# Patient Record
Sex: FEMALE | Employment: FULL TIME | URBAN - METROPOLITAN AREA
[De-identification: names, ages, dates, MRNs, and addresses within clinical notes are randomized per-mention and may not be internally consistent; named-entity substitution may affect disease eponyms.]

---

## 2019-06-24 ENCOUNTER — OFFICE VISIT (OUTPATIENT)
Dept: ORTHOPEDIC SURGERY | Age: 39
End: 2019-06-24

## 2019-06-24 VITALS
OXYGEN SATURATION: 100 % | WEIGHT: 217 LBS | TEMPERATURE: 98.2 F | HEIGHT: 67 IN | RESPIRATION RATE: 16 BRPM | HEART RATE: 87 BPM | SYSTOLIC BLOOD PRESSURE: 106 MMHG | DIASTOLIC BLOOD PRESSURE: 75 MMHG | BODY MASS INDEX: 34.06 KG/M2

## 2019-06-24 DIAGNOSIS — M54.12 CERVICAL RADICULOPATHY: ICD-10-CM

## 2019-06-24 DIAGNOSIS — M48.02 CERVICAL SPINAL STENOSIS: Primary | ICD-10-CM

## 2019-06-24 RX ORDER — DIVALPROEX SODIUM 500 MG/1
500 TABLET, EXTENDED RELEASE ORAL DAILY
COMMUNITY
Start: 2019-03-18

## 2019-06-24 RX ORDER — LIDOCAINE 50 MG/G
PATCH TOPICAL
COMMUNITY
Start: 2019-06-03

## 2019-06-24 RX ORDER — ACETAMINOPHEN 500 MG/1
500 TABLET, FILM COATED ORAL 2 TIMES DAILY
COMMUNITY
Start: 2019-06-03

## 2019-06-24 RX ORDER — DICLOFENAC SODIUM 10 MG/G
2 GEL TOPICAL DAILY
COMMUNITY
Start: 2019-06-03

## 2019-06-24 RX ORDER — CYCLOBENZAPRINE HCL 10 MG
10 TABLET ORAL 2 TIMES DAILY
COMMUNITY
Start: 2019-05-07

## 2019-06-24 NOTE — PROGRESS NOTES
[address]  [address], [address]  [place], [facility]  Phone: [phone]  Fax: [phone]  : 1980  PCP: Melanie Grider, DO      NEW PATIENT      ASSESSMENT AND PLAN     Diagnoses and all orders for this visit:    1. Cervical spinal stenosis    2. Cervical radiculopathy       1. 40yo RHD  with 6 months of RUE radiculopathy, failed PT, Gabapentin, Cymbalta. 2. No role for spinal injection given weakness. 3. Referral to Dr. Ivy Coats for surgical evaluation for hnp   4. Recommend moving computer to eye level, proper ergonomics. 5. Encouraged to continue HEP. 6. Avoid overhead lifting or reaching. 7. Given information on cervical stenosis    F/U with Dr. Ivy Coats for Surgical evaluation. CHIEF COMPLAINT  Briseyda Lezama is seen today in consultation at the request of Dr. Gerald Romberg for complaints of RUE pain. HISTORY OF PRESENT ILLNESS  Briseyda Lezama is a 35 y.o. female. RHD. Today pt c/o RUE pain of 6 months duration. Pt denies any specific incident or injury that caused their pain. Pain started in neck into shoulder and arm. Cannot move it due to pain with associated numbness and tingling. She reports increased clumsiness and weakness that affects ADL. She admits to poor quality sleep due to pain. Pt admits to cervicogenic headaches and frontal migraines. Location of pain: Right side of Neck  Does pain radiate into extremities: RUE  Does patient have weakness: yes, only on the right side  Pt denies saddle paresthesias. Medications pt is on: Tylenol, Voltaren, Lidoderm patches PRN. Pt denies recent ED visits or hospitalizations. Denies persistent fevers, chills, weight changes, neurogenic bowel or bladder symptoms.      Treatments patient has tried:  Physical therapy:Yes, 2 rounds with traction and no benefit 2019  Acupuncture: no benefit  Doing HEP: Unknown  Non-opioid medications: Yes, Failed Gabapentin without benefit, Cymbalta without benefit. MDP without benefit  Spinal injections: No  Spinal surgery- No.   Last C MRI 2019: Right C6-C7 disc herniation.  reviewed. PMHx of fibromyalgia and complex migraines. Pt employed as a  and notes mixed sitting and standing throughout the day. Pain Assessment  2019   Location of Pain Neck   Location Modifiers -   Severity of Pain 5   Quality of Pain Sharp   Quality of Pain Comment stabbing   Duration of Pain -   Frequency of Pain Constant   Aggravating Factors (No Data)   Aggravating Factors Comment Movement   Limiting Behavior -   Relieving Factors Rest   Relieving Factors Comment Lying Down        MRI Results (most recent):Last C MRI 2019  Right C6-C7 disc herniation.      PAST MEDICAL HISTORY   Past Medical History:   Diagnosis Date    Fibromyalgia     Migraines        Past Surgical History:   Procedure Laterality Date    HX ADENOIDECTOMY      HX APPENDECTOMY      HX  SECTION      x3    HX TONSILLECTOMY      IR CHOLECYSTOSTOMY PERCUTANEOUS         MEDICATIONS      Current Outpatient Medications   Medication Sig Dispense Refill    divalproex ER (DEPAKOTE ER) 500 mg ER tablet       TYLENOL EXTRA STRENGTH 500 mg tablet       cyclobenzaprine (FLEXERIL) 10 mg tablet       VOLTAREN 1 % gel       lidocaine (LIDODERM) 5 %          ALLERGIES    Allergies   Allergen Reactions    Iodinated Contrast- Oral And Iv Dye Hives    Penicillins Hives    Shellfish Derived Hives          SOCIAL HISTORY    Social History     Socioeconomic History    Marital status:      Spouse name: Not on file    Number of children: Not on file    Years of education: Not on file    Highest education level: Not on file   Occupational History    Not on file   Social Needs    Financial resource strain: Not on file    Food insecurity:     Worry: Not on file     Inability: Not on file    Transportation needs:     Medical: Not on file     Non-medical: Not on file   Tobacco Use    Smoking status: Never Smoker    Smokeless tobacco: Never Used   Substance and Sexual Activity    Alcohol use: Yes    Drug use: Not Currently    Sexual activity: Not on file   Lifestyle    Physical activity:     Days per week: Not on file     Minutes per session: Not on file    Stress: Not on file   Relationships    Social connections:     Talks on phone: Not on file     Gets together: Not on file     Attends Yazidism service: Not on file     Active member of club or organization: Not on file     Attends meetings of clubs or organizations: Not on file     Relationship status: Not on file    Intimate partner violence:     Fear of current or ex partner: Not on file     Emotionally abused: Not on file     Physically abused: Not on file     Forced sexual activity: Not on file   Other Topics Concern     Service Not Asked    Blood Transfusions Not Asked    Caffeine Concern Not Asked    Occupational Exposure Not Asked   Brian Proud Hazards Not Asked    Sleep Concern Not Asked    Stress Concern Not Asked    Weight Concern Not Asked    Special Diet Not Asked    Back Care Not Asked    Exercise Not Asked    Bike Helmet Not Asked   [address] Not Asked    Self-Exams Not Asked   Social History Narrative    Not on file       FAMILY HISTORY  No family history on file. REVIEW OF SYSTEMS  Review of Systems   Constitutional: Negative for chills, fever and weight loss. Respiratory: Negative for shortness of breath. Cardiovascular: Negative for chest pain. Gastrointestinal: Negative for constipation. Negative for fecal incontinence   Genitourinary: Negative for dysuria. Negative for urinary incontinence   Musculoskeletal:        Per HPI   Skin: Negative for rash. Neurological: Positive for tingling, focal weakness and headaches. Negative for dizziness and tremors. Endo/Heme/Allergies: Does not bruise/bleed easily. Psychiatric/Behavioral: The patient has insomnia. PHYSICAL EXAMINATION  Visit Vitals  /75   Pulse 87   Temp 98.2 °F (36.8 °C)   Resp 16   Ht 5' 7\" (1.702 m)   Wt 217 lb (98.4 kg)   SpO2 100%   BMI 33.99 kg/m²          Accompanied by self. Constitutional:  Well developed, well nourished, in no acute distress. Psychiatric: Affect and mood are appropriate. Integumentary: No rashes or abrasions noted on exposed areas. Cardiovascular/Peripheral Vascular: No peripheral edema is noted BLE. SPINE/MUSCULOSKELETAL EXAM    Cervical spine:  Neck is midline. Normal muscle tone. No focal atrophy is noted. Tenderness to palpation of right upper trap. Negative Spurling's sign. Negative Tinel's sign. Negative Valverde's sign. MOTOR:      Biceps  Triceps Deltoids Wrist Ext Wrist Flex Hand Intrin   Right +4/5 +4/5 +4/5 +4/5 +4/5 4/5   Left +4/5 +4/5 +4/5 +4/5 +4/5 +4/5     Weakness of pinch on R side. DTRs are Brisk, 2+ biceps, triceps, brachioradialis, patella, and Achilles. Ambulation without assistive device. FWB. Tandem gait intact. Written by Morelia Ty, as dictated by Gaynel Favre, MD.    I, Dr. Gaynel Favre, MD, confirm that all documentation is accurate. Ms. Jaylin Olson may have a reminder for a \"due or due soon\" health maintenance. I have asked that she contact her primary care provider for follow-up on this health maintenance.

## 2019-06-24 NOTE — PATIENT INSTRUCTIONS
Cervical Spinal Fusion: Before Your Surgery  What is cervical spinal fusion? Cervical spinal fusion is surgery that joins two or more of the vertebrae in your neck. When these bones are joined together, it's called fusion. After the joints are fused, they can no longer move. During the surgery, the doctor uses bone to make a \"bridge\" between your vertebrae. This bridge is strengthened with metal plates and screws. In most cases, the doctor uses bone from another part of your body or bone that has been donated to a bone bank. But sometimes artificial bone is used. To do the surgery, the doctor makes a cut in either the front or the back of your neck. The cut is called an incision. It leaves a scar that fades with time. After surgery, you will stay in the hospital for a few days. Your neck will feel stiff or sore. You will get medicine to help with pain. Most people can go back to work after 4 to 6 weeks. But it may take a few months to get back to all of your usual activities. Follow-up care is a key part of your treatment and safety. Be sure to make and go to all appointments, and call your doctor if you are having problems. It's also a good idea to know your test results and keep a list of the medicines you take. What happens before surgery?   Surgery can be stressful. This information will help you understand what you can expect. And it will help you safely prepare for surgery.   Preparing for surgery    · Understand exactly what surgery is planned, along with the risks, benefits, and other options. · Tell your doctors ALL the medicines, vitamins, supplements, and herbal remedies you take. Some of these can increase the risk of bleeding or interact with anesthesia.     · If you take blood thinners, such as warfarin (Coumadin), clopidogrel (Plavix), or aspirin, be sure to talk to your doctor. He or she will tell you if you should stop taking these medicines before your surgery.  Make sure that you understand exactly what your doctor wants you to do.     · Your doctor will tell you which medicines to take or stop before your surgery. You may need to stop taking certain medicines a week or more before surgery. So talk to your doctor as soon as you can.     · If you have an advance directive, let your doctor know. It may include a living will and a durable power of  for health care. Bring a copy to the hospital. If you don't have one, you may want to prepare one. It lets your doctor and loved ones know your health care wishes. Doctors advise that everyone prepare these papers before any type of surgery or procedure. What happens on the day of surgery? · Follow the instructions exactly about when to stop eating and drinking. If you don't, your surgery may be canceled. If your doctor told you to take your medicines on the day of surgery, take them with only a sip of water.     · Take a bath or shower before you come in for your surgery. Do not apply lotions, perfumes, deodorants, or nail polish.     · Do not shave the surgical site yourself.     · Take off all jewelry and piercings. And take out contact lenses, if you wear them.    At the hospital or surgery center   · Bring a picture ID.     · The area for surgery is often marked to make sure there are no errors.     · You will be kept comfortable and safe by your anesthesia provider. You will be asleep during the surgery.     · The surgery usually takes about 1 to 1½ hours.     · When you wake up, you will be lying on your back. You will have a soft or hard collar around your neck. This will protect and support your neck. It will also keep you from turning your head.     · You may have a small plastic tube coming out of your incision. This is to drain fluids. It's usually taken out in 1 or 2 days. Going home   · Be sure you have someone to drive you home.  Anesthesia and pain medicine make it unsafe for you to drive.     · You will be given more specific instructions about recovering from your surgery. They will cover things like diet, wound care, follow-up care, driving, and getting back to your normal routine. When should you call your doctor? · You have questions or concerns.     · You do not understand how to prepare for your surgery.     · You become ill before surgery (such as fever, flu, or a cold).     · You need to reschedule or have changed your mind about having the surgery. Where can you learn more? Go to http://rodrigo-denice.info/. Enter Y852 in the search box to learn more about \"Cervical Spinal Fusion: Before Your Surgery. \"  Current as of: September 20, 2018  Content Version: 11.9  © 4706-3648 [employer]. Care instructions adapted under license by [employer] (which disclaims liability or warranty for this information). If you have questions about a medical condition or this instruction, always ask your healthcare professional. [address] any warranty or liability for your use of this information. Neck: Exercises  Your Care Instructions  Here are some examples of typical rehabilitation exercises for your condition. Start each exercise slowly. Ease off the exercise if you start to have pain. Your doctor or physical therapist will tell you when you can start these exercises and which ones will work best for you. How to do the exercises  Neck stretch    1. This stretch works best if you keep your shoulder down as you lean away from it. To help you remember to do this, start by relaxing your shoulders and lightly holding on to your thighs or your chair. 2. Tilt your head toward your shoulder and hold for 15 to 30 seconds. Let the weight of your head stretch your muscles. 3. If you would like a little added stretch, use your hand to gently and steadily pull your head toward your shoulder.  For example, keeping your right shoulder down, lean your head to the left.  4. Repeat 2 to 4 times toward each shoulder. Diagonal neck stretch    1. Turn your head slightly toward the direction you will be stretching, and tilt your head diagonally toward your chest and hold for 15 to 30 seconds. 2. If you would like a little added stretch, use your hand to gently and steadily pull your head forward on the diagonal.  3. Repeat 2 to 4 times toward each side. Dorsal glide stretch    1. Sit or stand tall and look straight ahead. 2. Slowly tuck your chin as you glide your head backward over your body  3. Hold for a count of 6, and then relax for up to 10 seconds. 4. Repeat 8 to 12 times. Chest and shoulder stretch    1. Sit or stand tall and glide your head backward as in the dorsal glide stretch. 2. Raise both arms so that your hands are next to your ears. 3. Take a deep breath, and as you breathe out, lower your elbows down and behind your back. You will feel your shoulder blades slide down and together, and at the same time you will feel a stretch across your chest and the front of your shoulders. 4. Hold for about 6 seconds, and then relax for up to 10 seconds. 5. Repeat 8 to 12 times. Strengthening: Hands on head    1. Move your head backward, forward, and side to side against gentle pressure from your hands, holding each position for about 6 seconds. 2. Repeat 8 to 12 times. Follow-up care is a key part of your treatment and safety. Be sure to make and go to all appointments, and call your doctor if you are having problems. It's also a good idea to know your test results and keep a list of the medicines you take. Where can you learn more? Go to http://rodrigo-denice.info/. Enter P975 in the search box to learn more about \"Neck: Exercises. \"  Current as of: September 20, 2018  Content Version: 11.9  © 7539-0272 [employer].  Care instructions adapted under license by [employer] (which disclaims liability or warranty for this information). If you have questions about a medical condition or this instruction, always ask your healthcare professional. [address] any warranty or liability for your use of this information. Pinched Nerve in the Neck: Care Instructions  Your Care Instructions  A pinched nerve in the neck happens when a vertebra or disc in the upper part of your spine is damaged. This damage can happen because of an injury. Or it can just happen with age. The changes caused by the damage may put pressure on a nearby nerve root, pinching it. This causes symptoms such as sharp pain in your neck, shoulder, arm, hand, or back. You may also have tingling or numbness. Sometimes it makes your arm weaker. The symptoms are usually worse when you turn your head or strain your neck. For many people, the symptoms get better over time and finally go away. Early treatment usually includes medicines for pain and swelling. Sometimes physical therapy and special exercises may help. Follow-up care is a key part of your treatment and safety. Be sure to make and go to all appointments, and call your doctor if you are having problems. It's also a good idea to know your test results and keep a list of the medicines you take. How can you care for yourself at home? · Be safe with medicines. Read and follow all instructions on the label. ? If the doctor gave you a prescription medicine for pain, take it as prescribed. ? If you are not taking a prescription pain medicine, ask your doctor if you can take an over-the-counter medicine. · Try using a heating pad on a low or medium setting for 15 to 20 minutes every 2 or 3 hours. Try a warm shower in place of one session with the heating pad. You can also buy single-use heat wraps that last up to 8 hours. · You can also try an ice pack for 10 to 15 minutes every 2 to 3 hours. There isn't strong evidence that either heat or ice will help.  But you can try them to see if they help you. · Don't spend too long in one position. Take short breaks to move around and change positions. · Wear a seat belt and shoulder harness when you are in a car. · Sleep with a pillow under your head and neck that keeps your neck straight. · If you were given a neck brace (cervical collar) to limit neck motion, wear it as instructed for as many days as your doctor tells you to. Do not wear it longer than you were told to. Wearing a brace for too long can lead to neck stiffness and can weaken the neck muscles. · Follow your doctor's instructions for gentle neck-stretching exercises. · Do not smoke. Smoking can slow healing of your discs. If you need help quitting, talk to your doctor about stop-smoking programs and medicines. These can increase your chances of quitting for good. · Avoid strenuous work or exercise until your doctor says it is okay. When should you call for help? Call 911 anytime you think you may need emergency care. For example, call if:    · You are unable to move an arm or a leg at all.   [facility] your doctor now or seek immediate medical care if:    · You have new or worse symptoms in your arms, legs, chest, belly, or buttocks. Symptoms may include:  ? Numbness or tingling. ? Weakness. ? Pain.     · You lose bladder or bowel control.    Watch closely for changes in your health, and be sure to contact your doctor if:    · You are not getting better as expected. Where can you learn more? Go to http://rodrigo-denice.info/. Enter A592 in the search box to learn more about \"Pinched Nerve in the Neck: Care Instructions. \"  Current as of: September 20, 2018  Content Version: 11.9  © 0046-9855 [employer]. Care instructions adapted under license by [employer] (which disclaims liability or warranty for this information).  If you have questions about a medical condition or this instruction, always ask your healthcare professional. [employer] disclaims any warranty or liability for your use of this information.

## 2019-06-25 ENCOUNTER — OFFICE VISIT (OUTPATIENT)
Dept: ORTHOPEDIC SURGERY | Age: 39
End: 2019-06-25

## 2019-06-25 VITALS
TEMPERATURE: 98.1 F | WEIGHT: 215.8 LBS | SYSTOLIC BLOOD PRESSURE: 114 MMHG | DIASTOLIC BLOOD PRESSURE: 78 MMHG | OXYGEN SATURATION: 100 % | RESPIRATION RATE: 18 BRPM | HEART RATE: 93 BPM | HEIGHT: 67 IN | BODY MASS INDEX: 33.87 KG/M2

## 2019-06-25 DIAGNOSIS — M50.20 HNP (HERNIATED NUCLEUS PULPOSUS), CERVICAL: Primary | ICD-10-CM

## 2019-06-25 DIAGNOSIS — M54.2 CERVICAL PAIN: ICD-10-CM

## 2019-06-25 NOTE — PROGRESS NOTES
[address]  [address], [address]  [facility], [address]  Phone: [phone]  Fax: [phone]  INITIAL CONSULTATION  Patient: Jose Martinez                MRN: [id number]       SSN: [id number]  YOB: 1980        AGE: 35 y.o. SEX: female  Body mass index is 33.8 kg/m². PCP: Rita Morillo DO  06/25/19    Chief Complaint   Patient presents with    Neck Pain     SC          HISTORY OF PRESENT ILLNESS, RADIOGRAPHS, and PLAN:         HISTORY OF PRESENT ILLNESS:  Ms. Wendy Mckay was seen today at the request of Dr. Gomez Strange. The patient is a pleasant 48-year-old female who works as a .  Classically no medical history. Primary care doctor is Dr. Mirza Cerda. About a year ago, she woke up with severe pain in her neck and developed a radiculopathy in her right arm. The radiculopathy has been unresponsive to physical therapy, injections, medications, and the like. She finds it quite disabling with pain, numbness, and weakness. MRI discloses a right paracentral disc extrusion. ASSESSMENT/PLAN:   I discussed with her that her examination demonstrates antalgia and pain radiating in her right arm with neck motion. No objective neurology. I discussed with her the surgery, which would be a cervical decompression and fusion at C6-C7. I do not believe this level could be radiographically assisted intraoperatively for disc replacement. I have discussed with her the risks, benefits, complications, and alternatives to surgery. She wishes to proceed. We will proceed with cervical decompression and fusion once the appropriate approvals and clearances take place. cc:  Dr. Mirza Cerda          Past Medical History:   Diagnosis Date    Fibromyalgia     Migraines        History reviewed. No pertinent family history.     Current Outpatient Medications   Medication Sig Dispense Refill    divalproex ER (DEPAKOTE ER) 500 mg ER tablet       TYLENOL EXTRA STRENGTH 500 mg tablet       cyclobenzaprine (FLEXERIL) 10 mg tablet       VOLTAREN 1 % gel       lidocaine (LIDODERM) 5 %          Allergies   Allergen Reactions    Iodinated Contrast- Oral And Iv Dye Hives    Penicillins Hives    Shellfish Derived Hives       Past Surgical History:   Procedure Laterality Date    HX ADENOIDECTOMY      HX APPENDECTOMY      HX  SECTION      x3    HX TONSILLECTOMY      IR CHOLECYSTOSTOMY PERCUTANEOUS         Past Medical History:   Diagnosis Date    Fibromyalgia     Migraines        Social History     Socioeconomic History    Marital status:      Spouse name: Not on file    Number of children: Not on file    Years of education: Not on file    Highest education level: Not on file   Occupational History    Not on file   Social Needs    Financial resource strain: Not on file    Food insecurity:     Worry: Not on file     Inability: Not on file    Transportation needs:     Medical: Not on file     Non-medical: Not on file   Tobacco Use    Smoking status: Never Smoker    Smokeless tobacco: Never Used   Substance and Sexual Activity    Alcohol use:  Yes    Drug use: Not Currently    Sexual activity: Not on file   Lifestyle    Physical activity:     Days per week: Not on file     Minutes per session: Not on file    Stress: Not on file   Relationships    Social connections:     Talks on phone: Not on file     Gets together: Not on file     Attends Cheondoism service: Not on file     Active member of club or organization: Not on file     Attends meetings of clubs or organizations: Not on file     Relationship status: Not on file    Intimate partner violence:     Fear of current or ex partner: Not on file     Emotionally abused: Not on file     Physically abused: Not on file     Forced sexual activity: Not on file   Other Topics Concern     Service Not Asked    Blood Transfusions Not Asked    Caffeine Concern Not Asked    Occupational Exposure Not Asked   Hettie Model Hazards Not Asked    Sleep Concern Not Asked    Stress Concern Not Asked    Weight Concern Not Asked    Special Diet Not Asked    Back Care Not Asked    Exercise Not Asked    Bike Helmet Not Asked   [address] Not Asked    Self-Exams Not Asked   Social History Narrative    Not on file           REVIEW OF SYSTEMS:   CONSTITUTIONAL SYMPTOMS:  Negative. EYES:  Negative. EARS, NOSE, THROAT AND MOUTH:  Negative. CARDIOVASCULAR:  Negative. RESPIRATORY:  Negative. GENITOURINARY: Per HPI. GASTROINTESTINAL:  Per HPI. INTEGUMENTARY (SKIN AND/OR BREAST):  Negative. MUSCULOSKELETAL: Per HPI.   ENDOCRINE/RHEUMATOLOGIC:  Negative. NEUROLOGICAL:  Per HPI. HEMATOLOGIC/LYMPHATIC:  Negative. ALLERGIC/IMMUNOLOGIC:  Negative. PSYCHIATRIC:  Negative. PHYSICAL EXAMINATION:   Visit Vitals  /78   Pulse 93   Temp 98.1 °F (36.7 °C) (Oral)   Resp 18   Ht 5' 7\" (1.702 m)   Wt 215 lb 12.8 oz (97.9 kg)   SpO2 100%   BMI 33.80 kg/m²    PAIN SCALE: 5/10    CONSTITUTIONAL: The patient is in no apparent distress and is alert and oriented x 3. HEENT: Normocephalic. Hearing grossly intact. NECK: Supple and symmetric. no tenderness, or masses were felt. RESPIRATORY: No labored breathing. CARDIOVASCULAR: The carotid pulses were normal. Peripheral pulses were 2+. CHEST: Normal AP diameter and normal contour without any kyphoscoliosis. LYMPHATIC: No lymphadenopathy was appreciated in the neck, axillae or groin. SKIN:  Negative for scars, rashes, lesions, or ulcers on the right upper, right lower, left upper, left lower and trunk. NEUROLOGICAL: Alert and oriented x 3. Ambulation without assistive device. FWB. EXTREMITIES:  See musculoskeletal.  MUSCULOSKELETAL:   Head and Neck: Stabbing neck pain. Negative for misalignment, asymmetry, crepitation, defects, tenderness masses or effusions.     Left Upper Extremity: Inspection, percussion and palpation performed. Valverdes sign is negative.  Right Upper Extremity: Weakness. Inspection, percussion and palpation performed. Valverdes sign is negative.  Spine, Ribs and Pelvis: Inspection, percussion and palpation performed. Negative for misalignment, asymmetry, crepitation, defects, tenderness masses or effusions.  Left Lower Extremity: Inspection, percussion and palpation performed. Negative straight leg raise.  Right Lower Extremity: Inspection, percussion and palpation performed. Negative straight leg raise. SPINE EXAM:     Cervical spine: Neck is midline. Normal muscle tone. No focal atrophy is noted. Lumbar spine: No rash, ecchymosis, or gross obliquity. No focal atrophy is noted. ASSESSMENT    ICD-10-CM ICD-9-CM    1. HNP (herniated nucleus pulposus), cervical M50.20 722.0 AMB POC XRAY, SPINE, CERVICAL; 4+ VIE   2. Cervical pain M54.2 723.1 AMB POC XRAY, SPINE, CERVICAL; 4+ VIE       Written by Bigg Quintanilla, as dictated by Jose Dorado MD.    I, Dr. Jose Dorado MD, confirm that all documentation is accurate.

## 2019-06-25 NOTE — PROGRESS NOTES
[address] [facility]   Pre-Surgical Worksheet    Patient: Sola Austin                         MRN: [id number]     Age:  35 y.o.,      Sex: female    YOB: 1980           CANDIE: June 25, 2019  PCP: Suzanne Mtz, DO    Allergies   Allergen Reactions    Iodinated Contrast- Oral And Iv Dye Hives    Penicillins Hives    Shellfish Derived Hives         ICD-10-CM ICD-9-CM    1. HNP (herniated nucleus pulposus), cervical M50.20 722.0 AMB POC XRAY, SPINE, CERVICAL; 4+ VIE   2. Cervical pain M54.2 723.1 AMB POC XRAY, SPINE, CERVICAL; 4+ VIE       Surgery: ACDF C6-7. Pain Assessment   Pain Assessment  6/25/2019   Location of Pain Neck   Location Modifiers -   Severity of Pain 5   Quality of Pain Sharp   Quality of Pain Comment stabbing   Duration of Pain -   Frequency of Pain Constant   Aggravating Factors (No Data)   Aggravating Factors Comment Movement   Limiting Behavior -   Relieving Factors Rest   Relieving Factors Comment Lying Down        Visit Vitals  /78   Pulse 93   Temp 98.1 °F (36.7 °C) (Oral)   Resp 18   Ht 5' 7\" (1.702 m)   Wt 215 lb 12.8 oz (97.9 kg)   SpO2 100%   BMI 33.80 kg/m²       ADL Limits: Patient states current condition limits her from standing, walking, or driving to long. It interferes with her everyday schedule as well. Spine Surgery?: No    Spinal Injections?: No    Physical Therapy?: No    NSAID's?: No    Pain Medications?: No    In Pain Management: No    Current Outpatient Medications   Medication Sig    divalproex ER (DEPAKOTE ER) 500 mg ER tablet     TYLENOL EXTRA STRENGTH 500 mg tablet     cyclobenzaprine (FLEXERIL) 10 mg tablet     VOLTAREN 1 % gel     lidocaine (LIDODERM) 5 %      No current facility-administered medications for this visit.         Past Medical History:   Diagnosis Date    Fibromyalgia     Migraines        Past Surgical History:   Procedure Laterality Date    HX ADENOIDECTOMY      HX APPENDECTOMY      HX  SECTION      x3    HX TONSILLECTOMY      IR CHOLECYSTOSTOMY PERCUTANEOUS         Social History     Socioeconomic History    Marital status:      Spouse name: Not on file    Number of children: Not on file    Years of education: Not on file    Highest education level: Not on file   Tobacco Use    Smoking status: Never Smoker    Smokeless tobacco: Never Used   Substance and Sexual Activity    Alcohol use:  Yes    Drug use: Not Currently   Other Topics Concern

## 2019-07-11 ENCOUNTER — TELEPHONE (OUTPATIENT)
Dept: ORTHOPEDIC SURGERY | Age: 39
End: 2019-07-11

## 2019-07-11 NOTE — TELEPHONE ENCOUNTER
Patient called stating she is having surgery 7/26/19 but her right arm and neck are in severe pain. She can not move her arm or  anything. She is wondering if there is anything she can take or be prescribed.  Please advise patient at [phone]

## 2019-07-14 NOTE — TELEPHONE ENCOUNTER
Per Bassem Shannon \"Her sx is 7-24, I will try\"    Bassem Shannon, were you able to move her surgery up? Did you contact the patient?

## 2019-07-16 ENCOUNTER — ANESTHESIA EVENT (OUTPATIENT)
Dept: SURGERY | Age: 39
End: 2019-07-16
Payer: OTHER GOVERNMENT

## 2019-07-16 NOTE — H&P
Pre-Admission History and Physical    Patient: Sarbjit Anders   MRN: [id number]   SSN: [id number]   YOB: 1980   Age: 35 y.o. Sex: female     Patient scheduled for: ACDF C6/7. Date of surgery: 19. Location of surgery: [facility]. Surgeon: Milla Young MD    HPI:  Sarbjit Anders is a 35 y.o. female. About a year ago, she woke up with severe pain in her neck and developed a radiculopathy in her right arm. The radiculopathy has been unresponsive to physical therapy, injections, medications, and the like. She finds it quite disabling with pain, numbness, and weakness. MRI discloses a right paracentral disc extrusion. She reports a pain level of at least 5/10. Pain has impacted the patient's functional ability to use her RUE and complete ADLs and she is being admitted for surgical intervention. Past Medical History:   Diagnosis Date    Fibromyalgia     Migraines      Social History     Socioeconomic History    Marital status:      Spouse name: Not on file    Number of children: Not on file    Years of education: Not on file    Highest education level: Not on file   Tobacco Use    Smoking status: Never Smoker    Smokeless tobacco: Never Used   Substance and Sexual Activity    Alcohol use: Yes    Drug use: Not Currently   Other Topics Concern     Past Surgical History:   Procedure Laterality Date    HX ADENOIDECTOMY      HX APPENDECTOMY      HX  SECTION      x3    HX TONSILLECTOMY      IR CHOLECYSTOSTOMY PERCUTANEOUS       No family history on file.   Allergies   Allergen Reactions    Iodinated Contrast- Oral And Iv Dye Hives    Penicillins Hives    Shellfish Derived Hives     Current Outpatient Medications   Medication Sig Dispense Refill    divalproex ER (DEPAKOTE ER) 500 mg ER tablet       TYLENOL EXTRA STRENGTH 500 mg tablet       cyclobenzaprine (FLEXERIL) 10 mg tablet       VOLTAREN 1 % gel       lidocaine (LIDODERM) 5 % ROS:  Denies chills, fever,night sweats,  bowel or bladder dysfunction, unexplained weight loss/weight gain, chest pain, sob or anxiety. Physical Examination    Gen: Well developed, well nourished 35 y.o. female Visit Vitals  /78   Pulse 93   Temp 98.1 °F (36.7 °C) (Oral)   Resp 18   Ht 5' 7\" (1.702 m)   Wt 215 lb 12.8 oz (97.9 kg)   SpO2 100%   BMI 33.80 kg/m²    PAIN SCALE: 5/10     CONSTITUTIONAL: The patient is in no apparent distress and is alert and oriented x 3. HEENT: Normocephalic. Hearing grossly intact. NECK: Supple and symmetric. no tenderness, or masses were felt. RESPIRATORY: No labored breathing. CARDIOVASCULAR: The carotid pulses were normal. Peripheral pulses were 2+. CHEST: Normal AP diameter and normal contour without any kyphoscoliosis. LYMPHATIC: No lymphadenopathy was appreciated in the neck, axillae or groin. SKIN:  Negative for scars, rashes, lesions, or ulcers on the right upper, right lower, left upper, left lower and trunk. NEUROLOGICAL: Alert and oriented x 3. Ambulation without assistive device. FWB. EXTREMITIES:  See musculoskeletal.  MUSCULOSKELETAL:  · Head and Neck: Stabbing neck pain. Negative for misalignment, asymmetry, crepitation, defects, tenderness masses or effusions. · Left Upper Extremity: Inspection, percussion and palpation performed. Valverdes sign is negative. · Right Upper Extremity: Weakness. Inspection, percussion and palpation performed. Valverdes sign is negative. · Spine, Ribs and Pelvis: Inspection, percussion and palpation performed. Negative for misalignment, asymmetry, crepitation, defects, tenderness masses or effusions. · Left Lower Extremity: Inspection, percussion and palpation performed. Negative straight leg raise. · Right Lower Extremity: Inspection, percussion and palpation performed. Negative straight leg raise.        SPINE EXAM:      Cervical spine: Neck is midline. Normal muscle tone.  No focal atrophy is noted.    Assessment and Plan    Due to the pt's persistent symptoms unrelieved by conservative measure Bethany Pizarro is being admitted to [facility] to undergo surgical intervention. The post-operative plan of care consists of physical therapy, home health and a 2 week f/u office visit. We are pending medical clearance by Dr. Devon Brown. The risks, benefits, complications and alternatives to surgery have been discussed in detail with the patient. The patient understands and agrees to proceed.      Arthur Benavidez NP-BC dictating for Jackelin De La Cruz MD

## 2019-07-17 ENCOUNTER — APPOINTMENT (OUTPATIENT)
Dept: GENERAL RADIOLOGY | Age: 39
End: 2019-07-17
Attending: ORTHOPAEDIC SURGERY
Payer: OTHER GOVERNMENT

## 2019-07-17 ENCOUNTER — HOSPITAL ENCOUNTER (OUTPATIENT)
Age: 39
Setting detail: OBSERVATION
Discharge: HOME HEALTH | End: 2019-07-18
Attending: ORTHOPAEDIC SURGERY | Admitting: ORTHOPAEDIC SURGERY
Payer: OTHER GOVERNMENT

## 2019-07-17 ENCOUNTER — ANESTHESIA (OUTPATIENT)
Dept: SURGERY | Age: 39
End: 2019-07-17
Payer: OTHER GOVERNMENT

## 2019-07-17 DIAGNOSIS — Z98.1 S/P CERVICAL SPINAL FUSION: Primary | ICD-10-CM

## 2019-07-17 PROBLEM — M50.20 HNP (HERNIATED NUCLEUS PULPOSUS), CERVICAL: Status: ACTIVE | Noted: 2019-07-17

## 2019-07-17 LAB
ANION GAP SERPL CALC-SCNC: 7 MMOL/L (ref 3–18)
BUN SERPL-MCNC: 16 MG/DL (ref 7–18)
BUN/CREAT SERPL: 17 (ref 12–20)
CALCIUM SERPL-MCNC: 8.5 MG/DL (ref 8.5–10.1)
CHLORIDE SERPL-SCNC: 107 MMOL/L (ref 100–108)
CO2 SERPL-SCNC: 26 MMOL/L (ref 21–32)
CREAT SERPL-MCNC: 0.94 MG/DL (ref 0.6–1.3)
GLUCOSE SERPL-MCNC: 137 MG/DL (ref 74–99)
HCG UR QL: NEGATIVE
POTASSIUM SERPL-SCNC: 4.3 MMOL/L (ref 3.5–5.5)
SODIUM SERPL-SCNC: 140 MMOL/L (ref 136–145)

## 2019-07-17 PROCEDURE — 76210000001 HC OR PH I REC 2.5 TO 3 HR: Performed by: ORTHOPAEDIC SURGERY

## 2019-07-17 PROCEDURE — 74011000250 HC RX REV CODE- 250

## 2019-07-17 PROCEDURE — 77030018836 HC SOL IRR NACL ICUM -A: Performed by: ORTHOPAEDIC SURGERY

## 2019-07-17 PROCEDURE — 99218 HC RM OBSERVATION: CPT

## 2019-07-17 PROCEDURE — 77030010514 HC APPL CLP LIG COVD -B: Performed by: ORTHOPAEDIC SURGERY

## 2019-07-17 PROCEDURE — 77030020782 HC GWN BAIR PAWS FLX 3M -B: Performed by: ORTHOPAEDIC SURGERY

## 2019-07-17 PROCEDURE — 74011250636 HC RX REV CODE- 250/636

## 2019-07-17 PROCEDURE — 77030019908 HC STETH ESOPH SIMS -A: Performed by: ANESTHESIOLOGY

## 2019-07-17 PROCEDURE — 77030027138 HC INCENT SPIROMETER -A: Performed by: ORTHOPAEDIC SURGERY

## 2019-07-17 PROCEDURE — 80048 BASIC METABOLIC PNL TOTAL CA: CPT

## 2019-07-17 PROCEDURE — L0120 CERV FLEX N/ADJ FOAM PRE OTS: HCPCS | Performed by: ORTHOPAEDIC SURGERY

## 2019-07-17 PROCEDURE — 74011250636 HC RX REV CODE- 250/636: Performed by: ORTHOPAEDIC SURGERY

## 2019-07-17 PROCEDURE — C1713 ANCHOR/SCREW BN/BN,TIS/BN: HCPCS | Performed by: ORTHOPAEDIC SURGERY

## 2019-07-17 PROCEDURE — 77030013079 HC BLNKT BAIR HGGR 3M -A: Performed by: ANESTHESIOLOGY

## 2019-07-17 PROCEDURE — 74011250637 HC RX REV CODE- 250/637: Performed by: ORTHOPAEDIC SURGERY

## 2019-07-17 PROCEDURE — 74011250636 HC RX REV CODE- 250/636: Performed by: ANESTHESIOLOGY

## 2019-07-17 PROCEDURE — 81025 URINE PREGNANCY TEST: CPT

## 2019-07-17 PROCEDURE — 74011250636 HC RX REV CODE- 250/636: Performed by: NURSE PRACTITIONER

## 2019-07-17 PROCEDURE — 76060000033 HC ANESTHESIA 1 TO 1.5 HR: Performed by: ORTHOPAEDIC SURGERY

## 2019-07-17 PROCEDURE — 77030037102 HC SPCR CERV ALLGRFT TRIAD NUVA -G1: Performed by: ORTHOPAEDIC SURGERY

## 2019-07-17 PROCEDURE — 77030008683 HC TU ET CUF COVD -A: Performed by: ANESTHESIOLOGY

## 2019-07-17 PROCEDURE — 77030012406 HC DRN WND PENRS BARD -A: Performed by: ORTHOPAEDIC SURGERY

## 2019-07-17 PROCEDURE — 97116 GAIT TRAINING THERAPY: CPT

## 2019-07-17 PROCEDURE — 74011250636 HC RX REV CODE- 250/636: Performed by: NURSE ANESTHETIST, CERTIFIED REGISTERED

## 2019-07-17 PROCEDURE — 74011000250 HC RX REV CODE- 250: Performed by: ORTHOPAEDIC SURGERY

## 2019-07-17 PROCEDURE — 77030029099 HC BN WAX SSPC -A: Performed by: ORTHOPAEDIC SURGERY

## 2019-07-17 PROCEDURE — 77030031139 HC SUT VCRL2 J&J -A: Performed by: ORTHOPAEDIC SURGERY

## 2019-07-17 PROCEDURE — 36415 COLL VENOUS BLD VENIPUNCTURE: CPT

## 2019-07-17 PROCEDURE — 77030004402 HC BUR NEUR STRY -C: Performed by: ORTHOPAEDIC SURGERY

## 2019-07-17 PROCEDURE — 76010000149 HC OR TIME 1 TO 1.5 HR: Performed by: ORTHOPAEDIC SURGERY

## 2019-07-17 PROCEDURE — 74011250637 HC RX REV CODE- 250/637: Performed by: NURSE ANESTHETIST, CERTIFIED REGISTERED

## 2019-07-17 PROCEDURE — 77030040361 HC SLV COMPR DVT MDII -B: Performed by: ORTHOPAEDIC SURGERY

## 2019-07-17 PROCEDURE — 74011000272 HC RX REV CODE- 272: Performed by: ORTHOPAEDIC SURGERY

## 2019-07-17 PROCEDURE — 77030013567 HC DRN WND RESERV BARD -A: Performed by: ORTHOPAEDIC SURGERY

## 2019-07-17 PROCEDURE — 77030039266 HC ADH SKN EXOFIN S2SG -A: Performed by: ORTHOPAEDIC SURGERY

## 2019-07-17 PROCEDURE — 97161 PT EVAL LOW COMPLEX 20 MIN: CPT

## 2019-07-17 PROCEDURE — 77030011267 HC ELECTRD BLD COVD -A: Performed by: ORTHOPAEDIC SURGERY

## 2019-07-17 DEVICE — CAGE SPNL W11XH8XL14MM LUM LORD INTBDY FUS TRIAD CC: Type: IMPLANTABLE DEVICE | Site: SPINE CERVICAL | Status: FUNCTIONAL

## 2019-07-17 DEVICE — IMPLANTABLE DEVICE: Type: IMPLANTABLE DEVICE | Site: SPINE CERVICAL | Status: FUNCTIONAL

## 2019-07-17 DEVICE — SCREW SPNL L13MM DIA4MM ANT CERV SELF DRL VAR ANG ARCHON: Type: IMPLANTABLE DEVICE | Site: SPINE CERVICAL | Status: FUNCTIONAL

## 2019-07-17 RX ORDER — GLYCOPYRROLATE 0.2 MG/ML
INJECTION INTRAMUSCULAR; INTRAVENOUS AS NEEDED
Status: DISCONTINUED | OUTPATIENT
Start: 2019-07-17 | End: 2019-07-17 | Stop reason: HOSPADM

## 2019-07-17 RX ORDER — SODIUM CHLORIDE, SODIUM LACTATE, POTASSIUM CHLORIDE, CALCIUM CHLORIDE 600; 310; 30; 20 MG/100ML; MG/100ML; MG/100ML; MG/100ML
50 INJECTION, SOLUTION INTRAVENOUS CONTINUOUS
Status: DISCONTINUED | OUTPATIENT
Start: 2019-07-17 | End: 2019-07-17 | Stop reason: HOSPADM

## 2019-07-17 RX ORDER — SODIUM CHLORIDE 0.9 % (FLUSH) 0.9 %
5-40 SYRINGE (ML) INJECTION EVERY 8 HOURS
Status: DISCONTINUED | OUTPATIENT
Start: 2019-07-17 | End: 2019-07-18 | Stop reason: HOSPADM

## 2019-07-17 RX ORDER — DIVALPROEX SODIUM 500 MG/1
500 TABLET, EXTENDED RELEASE ORAL DAILY
Status: DISCONTINUED | OUTPATIENT
Start: 2019-07-18 | End: 2019-07-18 | Stop reason: HOSPADM

## 2019-07-17 RX ORDER — OXYCODONE HYDROCHLORIDE 5 MG/1
5-10 TABLET ORAL
Status: DISCONTINUED | OUTPATIENT
Start: 2019-07-17 | End: 2019-07-18 | Stop reason: HOSPADM

## 2019-07-17 RX ORDER — SODIUM CHLORIDE 0.9 % (FLUSH) 0.9 %
5-40 SYRINGE (ML) INJECTION EVERY 8 HOURS
Status: DISCONTINUED | OUTPATIENT
Start: 2019-07-17 | End: 2019-07-17 | Stop reason: HOSPADM

## 2019-07-17 RX ORDER — PROPOFOL 10 MG/ML
INJECTION, EMULSION INTRAVENOUS AS NEEDED
Status: DISCONTINUED | OUTPATIENT
Start: 2019-07-17 | End: 2019-07-17 | Stop reason: HOSPADM

## 2019-07-17 RX ORDER — FENTANYL CITRATE 50 UG/ML
INJECTION, SOLUTION INTRAMUSCULAR; INTRAVENOUS AS NEEDED
Status: DISCONTINUED | OUTPATIENT
Start: 2019-07-17 | End: 2019-07-17 | Stop reason: HOSPADM

## 2019-07-17 RX ORDER — VANCOMYCIN/0.9 % SOD CHLORIDE 1 G/100 ML
1 PLASTIC BAG, INJECTION (ML) INTRAVENOUS ONCE
Status: COMPLETED | OUTPATIENT
Start: 2019-07-17 | End: 2019-07-17

## 2019-07-17 RX ORDER — MIDAZOLAM HYDROCHLORIDE 1 MG/ML
INJECTION, SOLUTION INTRAMUSCULAR; INTRAVENOUS AS NEEDED
Status: DISCONTINUED | OUTPATIENT
Start: 2019-07-17 | End: 2019-07-17 | Stop reason: HOSPADM

## 2019-07-17 RX ORDER — ONDANSETRON 2 MG/ML
4 INJECTION INTRAMUSCULAR; INTRAVENOUS ONCE
Status: COMPLETED | OUTPATIENT
Start: 2019-07-17 | End: 2019-07-17

## 2019-07-17 RX ORDER — LIDOCAINE HYDROCHLORIDE 20 MG/ML
INJECTION, SOLUTION EPIDURAL; INFILTRATION; INTRACAUDAL; PERINEURAL AS NEEDED
Status: DISCONTINUED | OUTPATIENT
Start: 2019-07-17 | End: 2019-07-17 | Stop reason: HOSPADM

## 2019-07-17 RX ORDER — VANCOMYCIN/0.9 % SOD CHLORIDE 1.5G/250ML
1500 PLASTIC BAG, INJECTION (ML) INTRAVENOUS EVERY 12 HOURS
Status: COMPLETED | OUTPATIENT
Start: 2019-07-17 | End: 2019-07-18

## 2019-07-17 RX ORDER — LORAZEPAM 2 MG/ML
1 INJECTION INTRAMUSCULAR
Status: DISCONTINUED | OUTPATIENT
Start: 2019-07-17 | End: 2019-07-18 | Stop reason: HOSPADM

## 2019-07-17 RX ORDER — DIAZEPAM 5 MG/1
5 TABLET ORAL
Status: DISCONTINUED | OUTPATIENT
Start: 2019-07-17 | End: 2019-07-18 | Stop reason: HOSPADM

## 2019-07-17 RX ORDER — VANCOMYCIN HYDROCHLORIDE 1 G/20ML
INJECTION, POWDER, LYOPHILIZED, FOR SOLUTION INTRAVENOUS AS NEEDED
Status: DISCONTINUED | OUTPATIENT
Start: 2019-07-17 | End: 2019-07-17 | Stop reason: HOSPADM

## 2019-07-17 RX ORDER — NALOXONE HYDROCHLORIDE 0.4 MG/ML
0.4 INJECTION, SOLUTION INTRAMUSCULAR; INTRAVENOUS; SUBCUTANEOUS AS NEEDED
Status: DISCONTINUED | OUTPATIENT
Start: 2019-07-17 | End: 2019-07-18 | Stop reason: HOSPADM

## 2019-07-17 RX ORDER — SUCCINYLCHOLINE CHLORIDE 20 MG/ML
INJECTION INTRAMUSCULAR; INTRAVENOUS AS NEEDED
Status: DISCONTINUED | OUTPATIENT
Start: 2019-07-17 | End: 2019-07-17 | Stop reason: HOSPADM

## 2019-07-17 RX ORDER — SODIUM CHLORIDE 0.9 % (FLUSH) 0.9 %
5-40 SYRINGE (ML) INJECTION AS NEEDED
Status: DISCONTINUED | OUTPATIENT
Start: 2019-07-17 | End: 2019-07-17 | Stop reason: HOSPADM

## 2019-07-17 RX ORDER — DIPHENHYDRAMINE HYDROCHLORIDE 50 MG/ML
12.5 INJECTION, SOLUTION INTRAMUSCULAR; INTRAVENOUS ONCE
Status: COMPLETED | OUTPATIENT
Start: 2019-07-17 | End: 2019-07-17

## 2019-07-17 RX ORDER — SODIUM CHLORIDE 9 MG/ML
INJECTION, SOLUTION INTRAVENOUS
Status: DISCONTINUED | OUTPATIENT
Start: 2019-07-17 | End: 2019-07-17 | Stop reason: HOSPADM

## 2019-07-17 RX ORDER — DEXAMETHASONE SODIUM PHOSPHATE 4 MG/ML
INJECTION, SOLUTION INTRA-ARTICULAR; INTRALESIONAL; INTRAMUSCULAR; INTRAVENOUS; SOFT TISSUE AS NEEDED
Status: DISCONTINUED | OUTPATIENT
Start: 2019-07-17 | End: 2019-07-17 | Stop reason: HOSPADM

## 2019-07-17 RX ORDER — DIPHENHYDRAMINE HYDROCHLORIDE 50 MG/ML
INJECTION, SOLUTION INTRAMUSCULAR; INTRAVENOUS
Status: DISPENSED
Start: 2019-07-17 | End: 2019-07-18

## 2019-07-17 RX ORDER — ROCURONIUM BROMIDE 10 MG/ML
INJECTION, SOLUTION INTRAVENOUS AS NEEDED
Status: DISCONTINUED | OUTPATIENT
Start: 2019-07-17 | End: 2019-07-17 | Stop reason: HOSPADM

## 2019-07-17 RX ORDER — FENTANYL CITRATE 50 UG/ML
25 INJECTION, SOLUTION INTRAMUSCULAR; INTRAVENOUS AS NEEDED
Status: DISCONTINUED | OUTPATIENT
Start: 2019-07-17 | End: 2019-07-17 | Stop reason: HOSPADM

## 2019-07-17 RX ORDER — LIDOCAINE HYDROCHLORIDE 10 MG/ML
0.1 INJECTION, SOLUTION EPIDURAL; INFILTRATION; INTRACAUDAL; PERINEURAL AS NEEDED
Status: DISCONTINUED | OUTPATIENT
Start: 2019-07-17 | End: 2019-07-17 | Stop reason: HOSPADM

## 2019-07-17 RX ORDER — HYDROXYZINE HYDROCHLORIDE 10 MG/1
10 TABLET, FILM COATED ORAL
Status: DISCONTINUED | OUTPATIENT
Start: 2019-07-17 | End: 2019-07-18 | Stop reason: HOSPADM

## 2019-07-17 RX ORDER — METHYLENE BLUE 10 MG/ML
INJECTION INTRAVENOUS AS NEEDED
Status: DISCONTINUED | OUTPATIENT
Start: 2019-07-17 | End: 2019-07-17 | Stop reason: HOSPADM

## 2019-07-17 RX ORDER — DIPHENHYDRAMINE HYDROCHLORIDE 50 MG/ML
12.5 INJECTION, SOLUTION INTRAMUSCULAR; INTRAVENOUS
Status: DISCONTINUED | OUTPATIENT
Start: 2019-07-17 | End: 2019-07-18 | Stop reason: HOSPADM

## 2019-07-17 RX ORDER — FAMOTIDINE 20 MG/1
20 TABLET, FILM COATED ORAL ONCE
Status: COMPLETED | OUTPATIENT
Start: 2019-07-17 | End: 2019-07-17

## 2019-07-17 RX ORDER — ONDANSETRON 2 MG/ML
4 INJECTION INTRAMUSCULAR; INTRAVENOUS
Status: DISCONTINUED | OUTPATIENT
Start: 2019-07-17 | End: 2019-07-18 | Stop reason: HOSPADM

## 2019-07-17 RX ORDER — DEXTROSE, SODIUM CHLORIDE, AND POTASSIUM CHLORIDE 5; .45; .15 G/100ML; G/100ML; G/100ML
25 INJECTION INTRAVENOUS CONTINUOUS
Status: DISCONTINUED | OUTPATIENT
Start: 2019-07-17 | End: 2019-07-18 | Stop reason: HOSPADM

## 2019-07-17 RX ORDER — ONDANSETRON 2 MG/ML
INJECTION INTRAMUSCULAR; INTRAVENOUS AS NEEDED
Status: DISCONTINUED | OUTPATIENT
Start: 2019-07-17 | End: 2019-07-17 | Stop reason: HOSPADM

## 2019-07-17 RX ORDER — SODIUM CHLORIDE, SODIUM LACTATE, POTASSIUM CHLORIDE, CALCIUM CHLORIDE 600; 310; 30; 20 MG/100ML; MG/100ML; MG/100ML; MG/100ML
INJECTION, SOLUTION INTRAVENOUS
Status: DISCONTINUED | OUTPATIENT
Start: 2019-07-17 | End: 2019-07-17 | Stop reason: HOSPADM

## 2019-07-17 RX ORDER — CELECOXIB 400 MG/1
400 CAPSULE ORAL ONCE
Status: COMPLETED | OUTPATIENT
Start: 2019-07-17 | End: 2019-07-17

## 2019-07-17 RX ORDER — SODIUM CHLORIDE 0.9 % (FLUSH) 0.9 %
5-40 SYRINGE (ML) INJECTION AS NEEDED
Status: DISCONTINUED | OUTPATIENT
Start: 2019-07-17 | End: 2019-07-18 | Stop reason: HOSPADM

## 2019-07-17 RX ORDER — MIDAZOLAM HYDROCHLORIDE 1 MG/ML
2 INJECTION, SOLUTION INTRAMUSCULAR; INTRAVENOUS ONCE
Status: COMPLETED | OUTPATIENT
Start: 2019-07-17 | End: 2019-07-17

## 2019-07-17 RX ORDER — HYDROMORPHONE HYDROCHLORIDE 1 MG/ML
1 INJECTION, SOLUTION INTRAMUSCULAR; INTRAVENOUS; SUBCUTANEOUS
Status: DISCONTINUED | OUTPATIENT
Start: 2019-07-17 | End: 2019-07-18 | Stop reason: HOSPADM

## 2019-07-17 RX ORDER — HYDROMORPHONE HYDROCHLORIDE 2 MG/ML
0.5 INJECTION, SOLUTION INTRAMUSCULAR; INTRAVENOUS; SUBCUTANEOUS
Status: COMPLETED | OUTPATIENT
Start: 2019-07-17 | End: 2019-07-17

## 2019-07-17 RX ORDER — DIPHENHYDRAMINE HCL 25 MG
25 CAPSULE ORAL
Status: DISCONTINUED | OUTPATIENT
Start: 2019-07-17 | End: 2019-07-17 | Stop reason: SDUPTHER

## 2019-07-17 RX ORDER — PREGABALIN 75 MG/1
75 CAPSULE ORAL ONCE
Status: COMPLETED | OUTPATIENT
Start: 2019-07-17 | End: 2019-07-17

## 2019-07-17 RX ORDER — ACETAMINOPHEN 500 MG
1000 TABLET ORAL EVERY 6 HOURS
Status: DISCONTINUED | OUTPATIENT
Start: 2019-07-17 | End: 2019-07-18 | Stop reason: HOSPADM

## 2019-07-17 RX ADMIN — Medication 10 ML: at 16:40

## 2019-07-17 RX ADMIN — FAMOTIDINE 20 MG: 20 TABLET, FILM COATED ORAL at 08:48

## 2019-07-17 RX ADMIN — HYDROMORPHONE HYDROCHLORIDE 0.5 MG: 2 INJECTION INTRAMUSCULAR; INTRAVENOUS; SUBCUTANEOUS at 12:52

## 2019-07-17 RX ADMIN — HYDROMORPHONE HYDROCHLORIDE 1 MG: 1 INJECTION, SOLUTION INTRAMUSCULAR; INTRAVENOUS; SUBCUTANEOUS at 19:50

## 2019-07-17 RX ADMIN — FENTANYL CITRATE 50 MCG: 50 INJECTION, SOLUTION INTRAMUSCULAR; INTRAVENOUS at 12:05

## 2019-07-17 RX ADMIN — HYDROMORPHONE HYDROCHLORIDE 0.5 MG: 2 INJECTION INTRAMUSCULAR; INTRAVENOUS; SUBCUTANEOUS at 12:42

## 2019-07-17 RX ADMIN — SODIUM CHLORIDE, SODIUM LACTATE, POTASSIUM CHLORIDE, CALCIUM CHLORIDE: 600; 310; 30; 20 INJECTION, SOLUTION INTRAVENOUS at 10:32

## 2019-07-17 RX ADMIN — ONDANSETRON 4 MG: 2 INJECTION INTRAMUSCULAR; INTRAVENOUS at 21:22

## 2019-07-17 RX ADMIN — FENTANYL CITRATE 50 MCG: 50 INJECTION, SOLUTION INTRAMUSCULAR; INTRAVENOUS at 11:39

## 2019-07-17 RX ADMIN — ROCURONIUM BROMIDE 45 MG: 10 INJECTION, SOLUTION INTRAVENOUS at 10:45

## 2019-07-17 RX ADMIN — DIPHENHYDRAMINE HYDROCHLORIDE 12.5 MG: 50 INJECTION INTRAMUSCULAR; INTRAVENOUS at 13:50

## 2019-07-17 RX ADMIN — ROCURONIUM BROMIDE 5 MG: 10 INJECTION, SOLUTION INTRAVENOUS at 10:40

## 2019-07-17 RX ADMIN — Medication 10 ML: at 21:27

## 2019-07-17 RX ADMIN — SUCCINYLCHOLINE CHLORIDE 160 MG: 20 INJECTION INTRAMUSCULAR; INTRAVENOUS at 10:40

## 2019-07-17 RX ADMIN — PREGABALIN 75 MG: 75 CAPSULE ORAL at 08:48

## 2019-07-17 RX ADMIN — MIDAZOLAM HYDROCHLORIDE 2 MG: 1 INJECTION, SOLUTION INTRAMUSCULAR; INTRAVENOUS at 10:32

## 2019-07-17 RX ADMIN — HYDROMORPHONE HYDROCHLORIDE 0.5 MG: 2 INJECTION INTRAMUSCULAR; INTRAVENOUS; SUBCUTANEOUS at 13:02

## 2019-07-17 RX ADMIN — GLYCOPYRROLATE 0.2 MG: 0.2 INJECTION INTRAMUSCULAR; INTRAVENOUS at 10:32

## 2019-07-17 RX ADMIN — FENTANYL CITRATE 100 MCG: 50 INJECTION, SOLUTION INTRAMUSCULAR; INTRAVENOUS at 10:40

## 2019-07-17 RX ADMIN — DEXTROSE MONOHYDRATE, SODIUM CHLORIDE, AND POTASSIUM CHLORIDE 25 ML/HR: 50; 4.5; 1.49 INJECTION, SOLUTION INTRAVENOUS at 16:39

## 2019-07-17 RX ADMIN — FENTANYL CITRATE 25 MCG: 50 INJECTION INTRAMUSCULAR; INTRAVENOUS at 12:38

## 2019-07-17 RX ADMIN — MIDAZOLAM HYDROCHLORIDE 2 MG: 2 INJECTION, SOLUTION INTRAMUSCULAR; INTRAVENOUS at 10:02

## 2019-07-17 RX ADMIN — LIDOCAINE HYDROCHLORIDE 100 MG: 20 INJECTION, SOLUTION EPIDURAL; INFILTRATION; INTRACAUDAL; PERINEURAL at 10:40

## 2019-07-17 RX ADMIN — FENTANYL CITRATE 25 MCG: 50 INJECTION INTRAMUSCULAR; INTRAVENOUS at 13:08

## 2019-07-17 RX ADMIN — ONDANSETRON 4 MG: 2 INJECTION INTRAMUSCULAR; INTRAVENOUS at 10:32

## 2019-07-17 RX ADMIN — VANCOMYCIN HYDROCHLORIDE 1000 MG: 10 INJECTION, POWDER, LYOPHILIZED, FOR SOLUTION INTRAVENOUS at 09:56

## 2019-07-17 RX ADMIN — SODIUM CHLORIDE: 9 INJECTION, SOLUTION INTRAVENOUS at 10:38

## 2019-07-17 RX ADMIN — ONDANSETRON 4 MG: 2 INJECTION INTRAMUSCULAR; INTRAVENOUS at 13:50

## 2019-07-17 RX ADMIN — CELECOXIB 400 MG: 400 CAPSULE ORAL at 08:48

## 2019-07-17 RX ADMIN — HYDROMORPHONE HYDROCHLORIDE 0.5 MG: 2 INJECTION INTRAMUSCULAR; INTRAVENOUS; SUBCUTANEOUS at 13:22

## 2019-07-17 RX ADMIN — FENTANYL CITRATE 25 MCG: 50 INJECTION INTRAMUSCULAR; INTRAVENOUS at 12:58

## 2019-07-17 RX ADMIN — DEXAMETHASONE SODIUM PHOSPHATE 10 MG: 4 INJECTION, SOLUTION INTRA-ARTICULAR; INTRALESIONAL; INTRAMUSCULAR; INTRAVENOUS; SOFT TISSUE at 10:40

## 2019-07-17 RX ADMIN — OXYCODONE HYDROCHLORIDE 5 MG: 5 TABLET ORAL at 16:39

## 2019-07-17 RX ADMIN — SODIUM CHLORIDE, SODIUM LACTATE, POTASSIUM CHLORIDE, AND CALCIUM CHLORIDE 50 ML/HR: 600; 310; 30; 20 INJECTION, SOLUTION INTRAVENOUS at 08:49

## 2019-07-17 RX ADMIN — HYDROMORPHONE HYDROCHLORIDE 0.5 MG: 2 INJECTION INTRAMUSCULAR; INTRAVENOUS; SUBCUTANEOUS at 13:47

## 2019-07-17 RX ADMIN — PROPOFOL 200 MG: 10 INJECTION, EMULSION INTRAVENOUS at 10:40

## 2019-07-17 RX ADMIN — VANCOMYCIN HYDROCHLORIDE 1500 MG: 10 INJECTION, POWDER, LYOPHILIZED, FOR SOLUTION INTRAVENOUS at 19:49

## 2019-07-17 RX ADMIN — ACETAMINOPHEN 1000 MG: 500 TABLET ORAL at 16:39

## 2019-07-17 RX ADMIN — FENTANYL CITRATE 25 MCG: 50 INJECTION INTRAMUSCULAR; INTRAVENOUS at 12:48

## 2019-07-17 NOTE — PROGRESS NOTES
Problem: Infection - Risk of, Surgical Site Infection  Goal: *Absence of surgical site infection signs and symptoms  Outcome: Progressing Towards Goal     Problem: Patient Education: Go to Patient Education Activity  Goal: Patient/Family Education  Outcome: Progressing Towards Goal     Problem: Pain  Goal: *Control of Pain  Outcome: Progressing Towards Goal  Goal: *PALLIATIVE CARE:  Alleviation of Pain  Outcome: Progressing Towards Goal     Problem: Patient Education: Go to Patient Education Activity  Goal: Patient/Family Education  Outcome: Progressing Towards Goal     Problem: Deep Venous Thrombosis - Risk of  Goal: *Absence of deep venous thrombosis signs and symptoms(Stroke Metric)  Outcome: Progressing Towards Goal  Goal: *Absence of impaired coagulation signs and symptoms  Outcome: Progressing Towards Goal  Goal: *Knowledge of prescribed medications  Outcome: Progressing Towards Goal  Goal: *Absence of bleeding  Outcome: Progressing Towards Goal     Problem: Patient Education: Go to Patient Education Activity  Goal: Patient/Family Education  Outcome: Progressing Towards Goal     Problem: Falls - Risk of  Goal: *Absence of Falls  Description  Document Juli Edgar Fall Risk and appropriate interventions in the flowsheet.   Outcome: Progressing Towards Goal  Note:   Fall Risk Interventions:                                Problem: Patient Education: Go to Patient Education Activity  Goal: Patient/Family Education  Outcome: Progressing Towards Goal

## 2019-07-17 NOTE — PROGRESS NOTES
Problem: Mobility Impaired (Adult and Pediatric)  Goal: *Acute Goals and Plan of Care (Insert Text)  Description  Physical Therapy Goals  Initiated 7/17/2019 and to be accomplished within 7 day(s)  1. Patient will move from supine to sit and sit to supine , scoot up and down and roll side to side in bed with modified independence. 2.  Patient will transfer from bed to chair and chair to bed with modified independence using the least restrictive device. 3.  Patient will perform sit to stand with modified independence. 4.  Patient will ambulate with modified independence for 250 feet with the least restrictive device. 5.  Patient will ascend/descend 4 stairs with 1-2 handrail(s) with supervision/set-up. Prior Level of Function: Independent with mobility including gait no AD. Pt lives with her family in a single story home with 4 steps to enter B HRA. Pt is currently working. Outcome: Progressing Towards Goal   PHYSICAL THERAPY EVALUATION    Patient: Gregory Rosa (17 y.o. female)  Date: 7/17/2019  Primary Diagnosis: HNP (herniated nucleus pulposus), cervical [M50.20]  HNP (herniated nucleus pulposus), cervical [M50.20]  Procedure(s) (LRB):  ANTERIOR CERVICAL DISCECTOMY WITH FUSION C6/7; C-ARM/NUVASIVE (N/A) Day of Surgery   Precautions:   Fall(cervical)    ASSESSMENT :  PT orders received and patient cleared by nursing to participate with therapy. Patient is a 35 y.o. female admitted to the hospital due to ACDF C6/7Dr. Yanira Single 7/17/19. Patient consents to PT evaluation and treatment. Pt educated on safety, mobility, therex, cervical spine precautions, and OOB 3-5 times with nursing assistance. Supine to sit minimal assistance. Sit to stands contact guard assistance from elevated bed. Gait total 20 feet with RW contact guard assistance. Pt has unsteady gait pattern at this time with decreased step length. Pt was limited by dizziness and pain today.  Pt ended therapy sitting in chair with all needs met. Patient will benefit from skilled intervention to address the above impairments. Patient's rehabilitation potential is considered to be Good  Factors which may influence rehabilitation potential include:    ? None noted  ? Mental ability/status  ? Medical condition  ? Home/family situation and support systems  ? Safety awareness  ? Pain tolerance/management  ? Other:      PLAN :  Recommendations and Planned Interventions:    ?           Bed Mobility Training             ? Neuromuscular Re-Education  ? Transfer Training                   ? Orthotic/Prosthetic Training  ? Gait Training                          ? Modalities  ? Therapeutic Exercises           ? Edema Management/Control  ? Therapeutic Activities            ? Family Training/Education  ? Patient Education  ? Other (comment):    Frequency/Duration: Patient will be followed by physical therapy 1-2 times per day/4-7 days per week to address goals. Discharge Recommendations: Home Health  Further Equipment Recommendations for Discharge: rolling walker     SUBJECTIVE:   Patient stated Migdalia Scanlon.     OBJECTIVE DATA SUMMARY:     Past Medical History:   Diagnosis Date    Fibromyalgia     Migraines      Past Surgical History:   Procedure Laterality Date    HX ADENOIDECTOMY      HX APPENDECTOMY      HX  SECTION      x3    HX TONSILLECTOMY      IR CHOLECYSTOSTOMY PERCUTANEOUS       Barriers to Learning/Limitations: yes;  altered mental status (i.e.Sedation, Confusion)  Compensate with: N/A  Home Situation:  Home Situation  Home Environment: Private residence  # Steps to Enter: 4  Rails to Enter: Yes  Hand Rails : Bilateral  One/Two Story Residence: One story  Living Alone: No  Support Systems: Spouse/Significant Other/Partner, Child(nikolas), Family member(s)  Patient Expects to be Discharged to[de-identified] Private residence  Current DME Used/Available at Home: None  Critical Behavior:  Neurologic State: Alert  Orientation Level: Oriented X4  Cognition: Follows commands  Safety/Judgement: Fall prevention  Psychosocial  Patient Behaviors: Calm; Cooperative  Family  Behaviors: Calm; Cooperative  Purposeful Interaction: Yes  Caring Interventions: Reassure  Skin Condition/Temp: Dry;Warm  Family  Behaviors: Calm; Cooperative  Skin Integrity: Incision (comment)(anterior neck)  Skin Integumentary  Skin Color: Appropriate for ethnicity  Skin Condition/Temp: Dry;Warm  Skin Integrity: Incision (comment)(anterior neck)  Turgor: Non-tenting  Hair Growth: Present  Varicosities: Absent     B LE Strength:    Strength: Generally decreased, functional              B LE Tone & Sensation:   Tone: Normal          Sensation: Intact           B LE Range Of Motion:  AROM: Within functional limits                 Functional Mobility:  Bed Mobility:     Supine to Sit: Minimum assistance        Transfers:  Sit to Stand: Contact guard assistance   Stand to Sit: Contact guard assistance                       Balance:   Sitting: Impaired; With support  Sitting - Static: Good (unsupported)  Sitting - Dynamic: Good (unsupported); Fair (occasional)  Standing: Impaired; With support  Standing - Static: Fair  Standing - Dynamic : Fair  Ambulation/Gait Training:  Distance (ft): 20 Feet (ft)  Assistive Device: Walker, rolling  Ambulation - Level of Assistance: Contact guard assistance  Gait Abnormalities: Decreased step clearance  Base of Support: Center of gravity altered  Speed/Emely: Slow  Step Length: Right shortened;Left shortened    Therapeutic Exercises:   Reviewed and performed ankle pumps to increase blood flow and circulation. Pain:  Pain level pre-treatment: 7/10 neck  Pain level post-treatment: 7/10 neck  Pain Intervention(s) : Medication (see MAR);  Rest, Repositioning  Response to intervention: Nurse notified, See doc flow    Activity Tolerance: fair  Please refer to the flowsheet for vital signs taken during this treatment. After treatment:   ?         Patient left in no apparent distress sitting up in chair  ? Patient left in no apparent distress in bed  ? Call bell left within reach  ? Personal items in reach   ? Nursing notified Rajendra Duane  ? Caregiver present  ? Bed/chair alarm activated  ? SCDs applied     COMMUNICATION/EDUCATION:   ?         Role of Physical Therapy in the acute care setting. ?         Fall prevention education was provided and the patient/caregiver indicated understanding. ? Patient/family have participated as able in goal setting and plan of care. ?         Patient/family agree to work toward stated goals and plan of care. ?         Patient understands intent and goals of therapy, but is neutral about his/her participation. ? Patient is unable to participate in goal setting/plan of care: ongoing with therapy staff. ?         Out of bed with nursing assistance 3-5 times a day. ? Other:     Thank you for this referral.  Michael Numbers, PT, DPT   Time Calculation: 27 mins      Eval Complexity: History: LOW Complexity : Zero comorbidities / personal factors that will impact the outcome / POCExam:HIGH Complexity : 4+ Standardized tests and measures addressing body structure, function, activity limitation and / or participation in recreation  Presentation: LOW Complexity : Stable, uncomplicated  Clinical Decision Making:Low Complexity    Overall Complexity:LOW

## 2019-07-17 NOTE — ANESTHESIA POSTPROCEDURE EVALUATION
Procedure(s):  ANTERIOR CERVICAL DISCECTOMY WITH FUSION C6/7; C-ARM/NUVASIVE. general    Anesthesia Post Evaluation      Multimodal analgesia: multimodal analgesia used between 6 hours prior to anesthesia start to PACU discharge  Patient location during evaluation: PACU  Patient participation: complete - patient participated  Level of consciousness: awake  Pain management: adequate  Airway patency: patent  Anesthetic complications: no  Cardiovascular status: acceptable  Respiratory status: acceptable  Hydration status: acceptable  Post anesthesia nausea and vomiting:  controlled      Vitals Value Taken Time   /65 7/17/2019  1:56 PM   Temp 36.6 °C (97.8 °F) 7/17/2019 12:07 PM   Pulse 100 7/17/2019  2:02 PM   Resp 18 7/17/2019  2:02 PM   SpO2 99 % 7/17/2019  2:02 PM   Vitals shown include unvalidated device data.

## 2019-07-17 NOTE — PERIOP NOTES
TRANSFER - OUT REPORT:    Verbal report given to Debo PONCE(name) on [employer]  being transferred to [address] [place] [address](unit) for routine post - op       Report consisted of patients Situation, Background, Assessment and   Recommendations(SBAR). Information from the following report(s) SBAR, Kardex, OR Summary, Procedure Summary, Intake/Output, MAR, Recent Results and Med Rec Status was reviewed with the receiving nurse. Lines:   Peripheral IV 07/17/19 Left Hand (Active)   Site Assessment Clean, dry, & intact 7/17/2019 12:07 PM   Phlebitis Assessment 0 7/17/2019 12:07 PM   Infiltration Assessment 0 7/17/2019 12:07 PM   Dressing Status Clean, dry, & intact 7/17/2019 12:07 PM   Dressing Type Tape;Transparent 7/17/2019 12:07 PM   Hub Color/Line Status Pink; Infusing 7/17/2019 12:07 PM   Alcohol Cap Used No 7/17/2019  8:45 AM       Peripheral IV 07/17/19 Right Forearm (Active)   Site Assessment Clean, dry, & intact 7/17/2019 12:07 PM   Phlebitis Assessment 0 7/17/2019 12:07 PM   Infiltration Assessment 0 7/17/2019 12:07 PM   Dressing Status Clean, dry, & intact 7/17/2019 12:07 PM   Dressing Type Tape;Transparent 7/17/2019 12:07 PM   Hub Color/Line Status Pink;Capped 7/17/2019 12:07 PM   Alcohol Cap Used No 7/17/2019  8:45 AM        Opportunity for questions and clarification was provided.       Patient transported with:   O2 @ 3 liters  Tech

## 2019-07-17 NOTE — PROGRESS NOTES
OR Today ACDF C6/7  Hx: Fibro    VSS, LS clear, Apical pulse regular  Dressing clean, dry and intact, soft collar  SELENA:minimal output, none in PACU  UA: due to void  PT:due to ambulate  Neuro Intact. RUE pain is resolved. Incisional pain. Tolerating ice chips. Moving all extremities. Good strength to BUE. Reports itching- Benadryl already given. No rash or hives. Plan: Hydroxyzine PRN itching.     Hyun Leroy, NP

## 2019-07-17 NOTE — BRIEF OP NOTE
BRIEF OPERATIVE NOTE    Date of Procedure: 7/17/2019   Preoperative Diagnosis: HNP (herniated nucleus pulposus), cervical [M50.20]  Postoperative Diagnosis: HNP (herniated nucleus pulposus), cervical [M50.20]    Procedure(s):  ANTERIOR CERVICAL DISCECTOMY WITH FUSION C6/7; C-ARM/NUVASIVE  Surgeon(s) and Role:     Arleen Torres MD - Primary         Surgical Assistant: 0    Surgical Staff:  Circ-1: Conner Russ RN  Circ-Relief: Lori Oviedo RN  Radiology Technician: Lb Torres  Scrub Tech-1: Maykel Parr  Scrub Tech-Relief: Tracy Left  Surg Asst-1: Pennelope Rank  Event Time In Time Out   Incision Start 1100    Incision Close       Anesthesia: General   Estimated Blood Loss: 5  Specimens: * No specimens in log *   Findings: hnp   Complications: 0  Implants:   Implant Name Type Inv.  Item Serial No.  Lot No. LRB No. Used Action   SPACER CERV TRIAD [id number] -- ALLOGRAFT - [id number]  SPACER CERV TRIAD [id number] -- [id number] NUVASIVE  N/A 1 Implanted   PLATE SPNE ARCHON 1-LVL 22MM --  - [id number]  PLATE SPNE ARCHON 1-LVL 22MM --   NUVASIVE NA N/A 1 Implanted   SCR SPNE SD BHARATH ARCHON 4X13MM --  - [id number]  SCR SPNE SD BHARATH ARCHON 4X13MM --   NUVASIVE N/A N/A 4 Implanted

## 2019-07-17 NOTE — ROUTINE PROCESS
Assumed patient care. Received bedside shift report from [employer] Patient in bed, awake, alert and oriented x4 with HOB elevated to a semi fowlers position and neck collar in place. call light and and personal items placed in reach. 8:08 PM - Ambulated pt from bed to recliner chair and she started feeling dizzy so was not able to ambulate in hallway. Medicated for pain and assisted back to bed since pt states she still dizzy. Family at the bedside. 7:31 AM - Bedside shift change report given to [employer] (oncoming nurse) by Jarred Roberson RN (offgoing nurse). Report given with SBAR, Kardex, Intake/Output, MAR and Recent Results.

## 2019-07-17 NOTE — INTERVAL H&P NOTE
H&P Update:  Army Payan was seen and examined. History and physical has been reviewed. The patient has been examined.  There have been no significant clinical changes since the completion of the originally dated History and Physical.

## 2019-07-17 NOTE — ANESTHESIA PREPROCEDURE EVALUATION
Relevant Problems   No relevant active problems       Anesthetic History   No history of anesthetic complications            Review of Systems / Medical History  Patient summary reviewed and pertinent labs reviewed    Pulmonary  Within defined limits                 Neuro/Psych   Within defined limits           Cardiovascular  Within defined limits                     GI/Hepatic/Renal  Within defined limits              Endo/Other  Within defined limits           Other Findings              Physical Exam    Airway  Mallampati: II  TM Distance: 4 - 6 cm  Neck ROM: decreased range of motion   Mouth opening: Normal     Cardiovascular               Dental  No notable dental hx       Pulmonary                 Abdominal  GI exam deferred       Other Findings            Anesthetic Plan    ASA: 1  Anesthesia type: general          Induction: Intravenous  Anesthetic plan and risks discussed with: Patient

## 2019-07-18 ENCOUNTER — HOME HEALTH ADMISSION (OUTPATIENT)
Dept: HOME HEALTH SERVICES | Facility: HOME HEALTH | Age: 39
End: 2019-07-18
Payer: OTHER GOVERNMENT

## 2019-07-18 VITALS
SYSTOLIC BLOOD PRESSURE: 110 MMHG | BODY MASS INDEX: 33.12 KG/M2 | OXYGEN SATURATION: 100 % | HEART RATE: 77 BPM | DIASTOLIC BLOOD PRESSURE: 75 MMHG | TEMPERATURE: 97.7 F | RESPIRATION RATE: 18 BRPM | WEIGHT: 211 LBS | HEIGHT: 67 IN

## 2019-07-18 PROCEDURE — 97116 GAIT TRAINING THERAPY: CPT

## 2019-07-18 PROCEDURE — 99218 HC RM OBSERVATION: CPT

## 2019-07-18 PROCEDURE — 74011250636 HC RX REV CODE- 250/636: Performed by: ORTHOPAEDIC SURGERY

## 2019-07-18 PROCEDURE — 74011250637 HC RX REV CODE- 250/637: Performed by: ORTHOPAEDIC SURGERY

## 2019-07-18 PROCEDURE — 97165 OT EVAL LOW COMPLEX 30 MIN: CPT

## 2019-07-18 RX ORDER — OXYCODONE AND ACETAMINOPHEN 5; 325 MG/1; MG/1
1 TABLET ORAL
Qty: 20 TAB | Refills: 0 | Status: SHIPPED | OUTPATIENT
Start: 2019-07-18 | End: 2019-07-21

## 2019-07-18 RX ADMIN — HYDROMORPHONE HYDROCHLORIDE 1 MG: 1 INJECTION, SOLUTION INTRAMUSCULAR; INTRAVENOUS; SUBCUTANEOUS at 00:07

## 2019-07-18 RX ADMIN — ACETAMINOPHEN 1000 MG: 500 TABLET ORAL at 00:07

## 2019-07-18 RX ADMIN — VANCOMYCIN HYDROCHLORIDE 1500 MG: 10 INJECTION, POWDER, LYOPHILIZED, FOR SOLUTION INTRAVENOUS at 08:33

## 2019-07-18 RX ADMIN — Medication 10 ML: at 06:20

## 2019-07-18 RX ADMIN — ACETAMINOPHEN 1000 MG: 500 TABLET ORAL at 11:45

## 2019-07-18 RX ADMIN — OXYCODONE HYDROCHLORIDE 10 MG: 5 TABLET ORAL at 14:15

## 2019-07-18 RX ADMIN — ACETAMINOPHEN 1000 MG: 500 TABLET ORAL at 06:19

## 2019-07-18 RX ADMIN — OXYCODONE HYDROCHLORIDE 10 MG: 5 TABLET ORAL at 04:42

## 2019-07-18 RX ADMIN — OXYCODONE HYDROCHLORIDE 10 MG: 5 TABLET ORAL at 10:05

## 2019-07-18 RX ADMIN — DIVALPROEX SODIUM 500 MG: 500 TABLET, EXTENDED RELEASE ORAL at 08:33

## 2019-07-18 NOTE — PROGRESS NOTES
Reason for Admission:  HNP (herniated nucleus pulposus), cervical [M50.20]  HNP (herniated nucleus pulposus), cervical [M50.20]                 RRAT Score:    5            Plan for utilizing home health:    Yes, [facility]                      Likelihood of Readmission:   LOW                         Transition of Care Plan:              Initial assessment completed with patient. Cognitive status of patient: oriented to time, place, person and situation. Face sheet information confirmed:  yes. The patient designates  to participate in her discharge plan and to receive any needed information. This patient lives in a single family home with patient and . Patient is able to navigate steps as needed. Prior to hospitalization, patient was considered to be independent with ADLs/IADLS : yes . Patient has a current ACP document on file: no  The patient and wife will be available to transport patient home upon discharge. The patient already has none reported, medical equipment available in the home. CALLED [facility] WITH ORDERS FOR WALKER AND SHOWER CHAIR. Patient is not currently active with home health. .  Patient has not stayed in a skilled nursing facility or rehab. .      This patient is on dialysis :no      List of available Home Health agencies were provided and reviewed with the patient prior to discharge. Freedom of choice signed: yes, for [facility]. ORDERS SENT VIA quickhuddle. CALLED OFFICE PT PUT IN QUE. Jonathan Cloudo Currently, the discharge plan is Home with [address] [place] [address] Eugenio Rodney. The patient states that she can obtain her medications from the pharmacy, and take her medications as directed. Patient's current insurance is [employer]       Care Management Interventions  PCP Verified by CM:  Yes  Last Visit to PCP: 07/03/19  Mode of Transport at Discharge: Self  Transition of Care Consult (CM Consult): [address]: Yes  Physical Therapy Consult: Yes  Occupational Therapy Consult: Yes  Current Support Network: Lives with Spouse  Confirm Follow Up Transport: Family  Plan discussed with Pt/Family/Caregiver: Yes  Freedom of Choice Offered: Yes  Discharge Location  Discharge Placement: Home with home health        Lizbeth Hwang, [facility].  [phone]

## 2019-07-18 NOTE — PROGRESS NOTES
vss afeb  Neuro intact  Arm pain resolved  Had nausea last night with increased drain output  Will observe drain with diet this am- likely dc drain and home in afternoon.

## 2019-07-18 NOTE — OP NOTES
[address] [place] [address]   OPERATIVE REPORT    Name:  Marc Martinez  MR#:   [id number]  :  1980  ACCOUNT #:  [de-identified]  DATE OF SERVICE:  2019    PREOPERATIVE DIAGNOSIS:  Herniated nucleus pulposus, C6-C7. POSTOPERATIVE DIAGNOSIS:  Herniated nucleus pulposus, C6-C7. PROCEDURE PERFORMED:  Anterior cervical discectomy and fusion, C6-C7; structural allograft x1; anterior cervical plate fixation O0-W4 with NuVasive anterior cervical locking plate and screws. SURGEON:  Annie Cortes MD.    ASSISTANT:  None. ANESTHESIA:  General endotracheal.    COMPLICATIONS:  None. SPECIMENS REMOVED:  None. IMPLANTS:  NuVasive anterior cervical locking plate and screws and structural allograft. ESTIMATED BLOOD LOSS:  5 mL. FINDINGS:  The patient had extruded disc herniation at C6-C7 causing right paracentral cord compression. Bone quality was good. PROCEDURE:  Following induction of endotracheal anesthesia, the patient was placed with the head in neutral position in German headrest.  She was prepped and draped in the usual fashion. Right-sided approach was utilized. Sternocleidomastoid and great vessels were mobilized laterally. Esophagus and trachea mobilized medially. With blunt finger dissection, prevertebral fascia was entered and C-arm image verified our surgical levels. Hemostasis was copiously maintained. Lafayette pins were placed in the bodies of C6 and C7. Cloward self-retaining retractor was placed into the cover of longus colli musculature bilaterally. Annular tissue was incised and a radical discectomy done back to the posterior margin. Defect could be seen in the posterior longitudinal ligament explored and several extruded sequestrated disc fragments removed and posterior longitudinal ligament removed until I could confirm that a thorough decompression was accomplished of the epidural space. Endplates were prepared.   Various size structural allografts were sized, 7 was loose, 8 just had slight tightness without any distraction on. Eight structural allograft tamped firmly into place with excellent bony apposition. Anterior cervical locking plate was utilized, NuVasive type, with 2 screws in C6, 2 in C7, and the locking device engaged. The wound was irrigated. There was no apparent bleeding. Vancomycin powder instilled for infection prophylaxis. A deep drain placed. The neck was closed in layers and the skin closed with a subcuticular suture and Dermabond. A sterile occlusive dressing was placed upon the wound. All counts were correct.       MD MAURA Covington/V_[email or web address]_I/  D:  07/17/2019 12:24  T:  07/18/2019 23:32  JOB #:  [id number]

## 2019-07-18 NOTE — PROGRESS NOTES
Thank you for your referral for a front wheel walker and shower chair. Delivered front wheel walker to patients room,patient understands that insurance does not cover the shower chair.     Tu Posey Liaison  [facility]

## 2019-07-18 NOTE — PROGRESS NOTES
Date of Surgery: Post op day #1  Surgery:   ACDF C6/7  VSS  Wound: Dressing clean, dry and intact  SELENA: 2.5 mL, DC SELENA  PT: 200 ft  Swallowing ok. Neuro intact. Moving all extremities. 4/5 strength to BUE. Pre op pain:severe neck and right arm pain  Post op pain: pre-op pain resolved, incisional pain. Plan: [place] [place] DC home.      Glo Chen NP

## 2019-07-18 NOTE — PROGRESS NOTES
conducted an initial consultation and Spiritual Assessment for Neftali Rasmussen, who is a 35 y.o.,female. Patient's Primary Language is: [place]. According to the patient's EMR Mandaeism Affiliation is: [place]. The reason the Patient came to the hospital is:   Patient Active Problem List    Diagnosis Date Noted    HNP (herniated nucleus pulposus), cervical 07/17/2019        The  provided the following Interventions:  Initiated a relationship of care and support with this very pleasant 35year old female patient. Explored issues of krista, belief, spirituality and Voodoo/ritual needs while hospitalized. Listened empathically as the patient shared the reason for her hospitalization. Provided information about Spiritual Care Services. Offered prayer and assurance of continued prayers on patient's behalf. Chart reviewed. The following outcomes where achieved:  Patient shared limited information about both her medical narrative and spiritual journey/beliefs.  confirmed Patient's Mandaeism Affiliation. Patient processed feeling about current hospitalization. Patient expressed gratitude for 's visit. Assessment:  Patient does not have any Voodoo/cultural needs that will affect patient's preferences in health care. There are no spiritual or Voodoo issues which require intervention at this time. Plan:  Chaplains will continue to follow and will provide pastoral care on an as needed/requested basis.  recommends bedside caregivers page  on duty if patient shows signs of acute spiritual or emotional distress.     [address] [place] [address]   [phone]

## 2019-07-18 NOTE — PROGRESS NOTES
Problem: Self Care Deficits Care Plan (Adult)  Goal: *Acute Goals and Plan of Care (Insert Text)  Outcome: Resolved/Met   OCCUPATIONAL THERAPY EVALUATION/DISCHARGE    Patient: Neftali Vargas35 y.o. female)  Date: 7/18/2019  Primary Diagnosis: HNP (herniated nucleus pulposus), cervical [M50.20]  HNP (herniated nucleus pulposus), cervical [M50.20]  Procedure(s) (LRB):  ANTERIOR CERVICAL DISCECTOMY WITH FUSION C6/7; C-ARM/NUVASIVE (N/A) 1 Day Post-Op   Precautions: Fall; cervical/spinal  PLOF: Pt reports she lives with her  and three children in a [facility] with 4STE. She was (I) with basic self-care/ADLs and IADLs, including cooking, cleaning, and working PTA. ASSESSMENT AND RECOMMENDATIONS:  Based on the objective data described below, the patient presents she is motivated to return home as she is Mod (I) to (I) in basic self-care tasks, requiring Supervision for bathing and toileting tasks. Pt was able to perform LB dressing while sitting at chair level with modified independence, after educating pt on using cross legs method to don LB dressing. Pt presented she was able to ambulate to the bathroom for toileting/toilet transfers, demonstrating Fair+ dynamic standing balance using a RW. Will defer to PT for functional balance and mobility tasks. As pt does not currently have a shower chair, recommended to pt on having a shower chair to decrease the risk of falls. Also issued pt a long handled sponge to assist with LB bathing tasks (when appropriate) with pt appreciative of AE. Educated pt on the role of OT, adaptive equipment, log roll technique, and cervical/spinal precautions with the pt demonstrating good understanding. The pt reports her  and children will be at home to assist her PRN. Skilled occupational therapy is not indicated at this time.      Discharge Recommendations: None  Further Equipment Recommendations for Discharge: Shower chair to decrease the risk of falls     SUBJECTIVE:   Patient stated my 's in [employer]    OBJECTIVE DATA SUMMARY:     Past Medical History:   Diagnosis Date    Fibromyalgia     Migraines      Past Surgical History:   Procedure Laterality Date    HX ADENOIDECTOMY      HX APPENDECTOMY      HX  SECTION      x3    HX TONSILLECTOMY      IR CHOLECYSTOSTOMY PERCUTANEOUS       Barriers to Learning/Limitations: None  Compensate with: visual, verbal, tactile, kinesthetic cues/model    Home Situation:   Home Situation  Home Environment: Private residence  # Steps to Enter: 4  Rails to Enter: Yes  Hand Rails : Bilateral  One/Two Story Residence: One story  Living Alone: No  Support Systems: Spouse/Significant Other/Partner, Child(nikolas), Family member(s)  Patient Expects to be Discharged to[de-identified] Private residence  Current DME Used/Available at Home: None  Tub or Shower Type: Shower  ? Right hand dominant   ? Left hand dominant    Cognitive/Behavioral Status:  Neurologic State: Alert  Orientation Level: Oriented X4  Cognition: Follows commands  Safety/Judgement: Fall prevention    Skin: Visible skin appeared intact  Edema: None noted    Coordination: BUE  Coordination: Within functional limits(B shld 0-90* to follow cervical precautions)  Fine Motor Skills-Upper: Left Intact; Right Intact    Gross Motor Skills-Upper: Left Intact; Right Intact    Balance:  Sitting: Intact  Standing: Intact; With support  Standing - Static: Good  Standing - Dynamic : Fair    Strength: BUE  Strength: Generally decreased, functional    Tone & Sensation: BUE  Tone: Normal  Sensation: Intact    Range of Motion: BUE  AROM: Within functional limits(B shld 0-90* to follow cervical precautions)      Functional Mobility and Transfers for ADLs:  Bed Mobility:  Supine to Sit: Modified independent  Scooting: Modified independent  Transfers:  Sit to Stand: Supervision  Stand to Sit: Supervision   Toilet Transfer : Supervision   Bathroom Mobility: Supervision/set up    ADL Assessment:  Feeding: Independent    Oral Facial Hygiene/Grooming: Independent    Bathing: Supervision(Issued long handled sponge for LB bathing)    Upper Body Dressing: Independent    Lower Body Dressing: Modified independent(Using cross legs method)    Toileting: Supervision      ADL Intervention:  LB dressing using cross legs method: Modified Independent  Toilet transfers: Supervision using Rw. No LOB noted. Cognitive Retraining  Safety/Judgement: Fall prevention    Pain:  Pain level pre-treatment: 6/10 (Neck)  Pain level post-treatment: 6/10   Pain Intervention(s): Medication (see MAR); Rest, Ice, Repositioning  Response to intervention: Nurse notified, See doc flow    Activity Tolerance:   Good    Please refer to the flowsheet for vital signs taken during this treatment. After treatment:   ?  Patient left in no apparent distress sitting up in chair  ? Patient left in no apparent distress in bed  ? Call bell left within reach  ? Nursing notified  ? Caregiver present  ? Bed alarm activated    COMMUNICATION/EDUCATION:   ?      Role of Occupational Therapy in the acute care setting  ? Home safety education was provided and the patient/caregiver indicated understanding. ? Patient/family have participated as able and agree with findings and recommendations. ?      Patient is unable to participate in plan of care at this time. Thank you for this referral.  Luz Linton, OT  Time Calculation: 15 mins      Eval Complexity: History: LOW Complexity : Brief history review ; Examination: LOW Complexity : 1-3 performance deficits relating to physical, cognitive , or psychosocial skils that result in activity limitations and / or participation restrictions ;    Decision Making:LOW Complexity : No comorbidities that affect functional and no verbal or physical assistance needed to complete eval tasks

## 2019-07-18 NOTE — PROGRESS NOTES
Discharge order noted for today. Pt has been accepted to [facility] agency. Met with patient and spouse and are agreeable to the transition plan today. Transport has been arranged through . Patient's discharge summary and home health  orders have been forwarded to [address] [place] [address] via [facility]. Updated bedside RN, Amadeo Schultz,  to the transition plan. Discharge information has been documented on the AVS. DME consult to  [facility] for walker and shower chair.        Todd Valdivia RN  Care Manager  [phone]

## 2019-07-18 NOTE — DISCHARGE INSTRUCTIONS
Post-Operative Discharge Instructions after [facility]  [phone]      You will return to the office 2 weeks after surgery. (Appointment was made at the time you scheduled your surgery)    Call office or physician on-call for any of the following:  · Fever greater than 100.5  · Uncontrollable chills  · Drainage from incision  · Pain not controlled by pain medication  · New pain  · New weakness or progressive weakness  · Food sticking in throat or excessive coughing when swallowing    Stop all anti-inflammatories (arthritis medication) such as Ibuprofen, Motrin, Aleve, Voltaren, Relafen, Naproxen, Arthrotec, etc. for 12 weeks ONLY if you had a neck or back Fusion. You may take Tylenol or acetaminophen over the counter. You may take Tylenol for pain. Tylenol 1000 mg    Next time you can take is 1800    Take your pain medication as prescribed. Medication Name Percocet    Next dose can be taken at 1800      May remove DANDY stockings when discharged from the hospital.    May shower on the third day after surgery. Leave dressing on while you shower; the dressing is waterproof. No need for a dressing on neck patients after the third day. Abstain from driving until your first post-operative visit. If you must drive, do not take pain medications that may alter your abilities. Neck collars are for comfort only. Neck patients feel better sleeping in a recliner or \"propped up\" position for a few days after surgery. This helps reduce the swelling. It is normal for neck patients to have some difficulty swallowing the first few days. Use a straw for all liquids. Cut up solid foods smaller than normal until you are swallowing without difficulty. Walking is the best therapy after back surgery. Avoid extremes of bending, twisting, or lifting. All post-operative surgical patients should function within the range of the pain.  \"If it hurts - do not do it.\"    Thank you for choosing [facility] for your neck surgery. PATIENT DISCHARGE INSTRUCTIONS      PATIENT DISCHARGE INSTRUCTIONS    [place] / [id number] : 1980    Admitted 2019 Discharged: 2019       · It is important that you take the medication exactly as they are prescribed. · Keep your medication in the bottles provided by the pharmacist and keep a list of the medication names, dosages, and times to be taken in your wallet. · Do not take other medications without consulting your doctor. What to do at Home    Recommended Diet: Regular Diet    Recommended Activity: No lifting, Driving, or Strenuous exercise for 2 weeks. If you experience any of the following symptoms  fever greater than 101.5, wound drainage, redness at incision site, increased pain, new onset of extremity numbness/tingling/weakness or gait disturbance, bladder or bowel dysfunction, please follow up with [facility].

## 2019-07-18 NOTE — PROGRESS NOTES
Observation notice provided in writing to patient and/or caregiver as well as verbal explanation of the policy. Patients who are in outpatient status also receive the Observation notice.     Parag Cameron RN  Care Manager  [phone]

## 2019-07-18 NOTE — PROGRESS NOTES
Problem: Infection - Risk of, Surgical Site Infection  Goal: *Absence of surgical site infection signs and symptoms  Outcome: Progressing Towards Goal     Problem: Patient Education: Go to Patient Education Activity  Goal: Patient/Family Education  Outcome: Progressing Towards Goal     Problem: Pain  Goal: *Control of Pain  Outcome: Progressing Towards Goal  Goal: *PALLIATIVE CARE:  Alleviation of Pain  Outcome: Progressing Towards Goal     Problem: Patient Education: Go to Patient Education Activity  Goal: Patient/Family Education  Outcome: Progressing Towards Goal     Problem: Deep Venous Thrombosis - Risk of  Goal: *Absence of deep venous thrombosis signs and symptoms(Stroke Metric)  Outcome: Progressing Towards Goal  Goal: *Absence of impaired coagulation signs and symptoms  Outcome: Progressing Towards Goal  Goal: *Knowledge of prescribed medications  Outcome: Progressing Towards Goal  Goal: *Absence of bleeding  Outcome: Progressing Towards Goal     Problem: Patient Education: Go to Patient Education Activity  Goal: Patient/Family Education  Outcome: Progressing Towards Goal     Problem: Falls - Risk of  Goal: *Absence of Falls  Description  Document Komal Valdivia Fall Risk and appropriate interventions in the flowsheet.   Outcome: Progressing Towards Goal  Note:   Fall Risk Interventions:  Mobility Interventions: Assess mobility with egress test, Communicate number of staff needed for ambulation/transfer, Patient to call before getting OOB         Medication Interventions: Patient to call before getting OOB    Elimination Interventions: Call light in reach, Patient to call for help with toileting needs              Problem: Patient Education: Go to Patient Education Activity  Goal: Patient/Family Education  Outcome: Progressing Towards Goal     Problem: Patient Education: Go to Patient Education Activity  Goal: Patient/Family Education  Outcome: Progressing Towards Goal

## 2019-07-18 NOTE — DISCHARGE SUMMARY
Discharge  Summary     Patient: Kam Hamilton MRN: [id number]  SSN: [id number]    YOB: 1980  Age: 35 y.o. Sex: female       Admit Date: 7/17/2019    Discharge Date: 7/18/2019      Admission Diagnoses: HNP (herniated nucleus pulposus), cervical [M50.20]  HNP (herniated nucleus pulposus), cervical [M50.20]    Discharge Diagnoses:   Problem List as of 7/18/2019 Date Reviewed: 6/25/2019          Codes Class Noted - Resolved    HNP (herniated nucleus pulposus), cervical ICD-10-CM: M50.20  ICD-9-CM: 722.0  7/17/2019 - Present               Discharge Condition: Good    Procedure: ACDF C6/7    Hospital Course: Normal hospital course for this procedure. Tolerated surgical intervention well. VSS Throughout. Neuro intact. Incision dry and intact, tolerating PO intake, voiding adequately. Ambulatory. Disposition: home    Discharge Medications:   Current Discharge Medication List      START taking these medications    Details   oxyCODONE-acetaminophen (PERCOCET) 5-325 mg per tablet Take 1 Tab by mouth every six (6) hours as needed for Pain for up to 3 days. Max Daily Amount: 4 Tabs. Qty: 20 Tab, Refills: 0    Associated Diagnoses: S/P cervical spinal fusion         CONTINUE these medications which have NOT CHANGED    Details   divalproex ER (DEPAKOTE ER) 500 mg ER tablet Take 500 mg by mouth daily. TYLENOL EXTRA STRENGTH 500 mg tablet       cyclobenzaprine (FLEXERIL) 10 mg tablet       VOLTAREN 1 % gel       lidocaine (LIDODERM) 5 %                Follow-up Appointments   Procedures    FOLLOW UP VISIT Appointment in: Two Weeks     Standing Status:   Standing     Number of Occurrences:   1     Order Specific Question:   Appointment in     Answer:    Two Weeks       Signed By: Mayuri Justice NP     July 18, 2019

## 2019-07-18 NOTE — PROGRESS NOTES
Mobility Intervention:       [] Pt dangled at edge of bed    [] Pt assisted OOB to bedside commode    [] Pt assisted OOB to chair    [] Pt ambulated to bathroom    [] Patient was ambulated in room/hallway    Assistive Device Utilized:       [] Rolling walker   [] Crutches   [] Straight Cane   [] Knee immobilizer   [] IV pole    After Mobilization:     [] Patient left in no apparent distress sitting up in chair  [] Patient left in no apparent distress in bed  [] Call bell left within reach  [] SCDs on & machine turned on  [] Ice applied  [] RN notified  [] Caregiver present  [] Bed alarm activated    Reason patient not mobilized:      [] Patient refused   [] Nausea/vomiting   [] Low blood pressure   [] Drowsy/lethargic    Pain Rating:     [] 0  [] 1  Assistive Device:        [] 2  [] 3  [] 4  [] 5  [] 6  Assistive Device:        [] 7  [] 8  [] 9  [] 10    Comments:     Mobility Intervention:       [x] Pt dangled at edge of bed    [] Pt assisted OOB to bedside commode    [] Pt assisted OOB to chair    [x] Pt ambulated to bathroom    [x] Patient was ambulated in room/hallway    Assistive Device Utilized:       [x] Rolling walker   [] Crutches   [] Straight Cane   [] Knee immobilizer   [] IV pole    After Mobilization:     [x] Patient left in no apparent distress sitting up in chair  [] Patient left in no apparent distress in bed  [x] Call bell left within reach  [x] SCDs on & machine turned on  [] Ice applied  [x] RN notified  [] Caregiver present  [] Bed alarm activated    Reason patient not mobilized:      [] Patient refused   [] Nausea/vomiting   [] Low blood pressure   [] Drowsy/lethargic    Pain Rating:     [] 0  [] 1  Assistive Device:        [] 2  [] 3  [] 4  [] 5  [] 6  Assistive Device:        [] 7  [x] 8  [] 9  [] 10    Comments:after a few steps out the room,the patient was dizzy and returned her to the room. She is sitting on recliner,Pain rating,8.  Nurse notified

## 2019-07-18 NOTE — PROGRESS NOTES
Problem: Mobility Impaired (Adult and Pediatric)  Goal: *Acute Goals and Plan of Care (Insert Text)  Description  Physical Therapy Goals  Initiated 7/17/2019 and to be accomplished within 7 day(s)  1. Patient will move from supine to sit and sit to supine , scoot up and down and roll side to side in bed with modified independence. 2.  Patient will transfer from bed to chair and chair to bed with modified independence using the least restrictive device. 3.  Patient will perform sit to stand with modified independence. 4.  Patient will ambulate with modified independence for 250 feet with the least restrictive device. 5.  Patient will ascend/descend 4 stairs with 1-2 handrail(s) with supervision/set-up. Prior Level of Function: Independent with mobility including gait no AD. Pt lives with her family in a single story home with 4 steps to enter B HRA. Pt is currently working. Outcome: Progressing Towards Goal   PHYSICAL THERAPY TREATMENT    Patient: Zara Jimenes (9 y.o. female)  Date: 7/18/2019  Diagnosis: HNP (herniated nucleus pulposus), cervical [M50.20]  HNP (herniated nucleus pulposus), cervical [M50.20] <principal problem not specified>  Procedure(s) (LRB):  ANTERIOR CERVICAL DISCECTOMY WITH FUSION C6/7; C-ARM/NUVASIVE (N/A) 1 Day Post-Op  Precautions: Fall(cervical)    ASSESSMENT:  Patient presents today alert and agreeable to therapy, supine in bed upon arrival. Patient was educated on spinal precautions and demos good carryover of learning. Patient was given demo with instruction on sit <> stand transfer and gait training and transferred to standing with modified independence and ambulated total 200ft at slow pace with RW and decreased bilateral step length. Patient educated on deep breathing and relaxation at [facility] to alleviate surgical pain.  Patient negotiated 4 steps with HR and at conclusion of session patient transferred to sitting in recliner/supine in bed and was left resting with call bell by the side and SCDs donned. Patient instructed to call for assistance if they needed to get up for any reason and denied need for further assistance. Patient is safe for home mobility with recommendations listed below. Progression toward goals:   ?      Improving appropriately and progressing toward goals  ? Improving slowly and progressing toward goals  ? Not making progress toward goals and plan of care will be adjusted     PLAN:  Patient continues to benefit from skilled intervention to address the above impairments. Continue treatment per established plan of care. Discharge Recommendations:  Home Health  Further Equipment Recommendations for Discharge:  rolling walker     SUBJECTIVE:   Patient stated I have to remember to breathe. My 's always telling me that.     OBJECTIVE DATA SUMMARY:   Critical Behavior:  Neurologic State: Alert  Orientation Level: Oriented X4  Cognition: Follows commands  Safety/Judgement: Fall prevention  Functional Mobility Training:  Bed Mobility:   Supine to Sit: Modified independent   Scooting: Modified independent   Transfers:  Sit to Stand: Supervision  Stand to Sit: Supervision    Balance:  Sitting: Intact  Standing: Intact; With support  Standing - Static: Good  Standing - Dynamic : Fair   Ambulation/Gait Training:  Distance (ft): 200 Feet (ft)  Assistive Device: Walker, rolling  Ambulation - Level of Assistance: Supervision   Gait Abnormalities: Decreased step clearance   Speed/Emely: Slow  Step Length: Left shortened;Right shortened   Interventions: Verbal cues; Visual/Demos   Stairs:  Number of Stairs Trained: 4  Stairs - Level of Assistance: Supervision  Rail Use: Left   Neuro Re-Education:  ?         Bed mobility  ? Transfers  ? Ambulation  ? Assistive device management  ? Stairs  ? Body mechanics  ? Position change  ? Activity pacing/energy conservation  ?          Other: Pain:  Pain level pre-treatment: 4/10  Pain level post-treatment: 4/10   Pain Intervention(s): Medication (see MAR); Rest, Repositioning   Response to intervention: Nurse notified, See doc flow    Activity Tolerance:   Patient tolerated activity well; denied dizziness, chest pain, or SOB. Demos good carryover of precautions. Please refer to the flowsheet for vital signs taken during this treatment. After treatment:   ? Patient left in no apparent distress sitting up in chair  ? Patient left in no apparent distress in bed  ? Call bell left within reach  ? Nursing notified  ? Caregiver present  ? Bed alarm activated  ? SCDs applied      COMMUNICATION/EDUCATION:   ?         Role of Physical Therapy in the acute care setting. ?         Fall prevention education was provided and the patient/caregiver indicated understanding. ? Patient/family have participated as able in working toward goals and plan of care. ?         Patient/family agree to work toward stated goals and plan of care. ?         Patient understands intent and goals of therapy, but is neutral about his/her participation. ? Patient is unable to participate in stated goals/plan of care: ongoing with therapy staff.   ?         Other:        Cristofer Gresham, PT   Time Calculation: 23 mins

## 2019-07-19 ENCOUNTER — HOME CARE VISIT (OUTPATIENT)
Dept: SCHEDULING | Facility: HOME HEALTH | Age: 39
End: 2019-07-19
Payer: OTHER GOVERNMENT

## 2019-07-19 PROCEDURE — 400013 HH SOC

## 2019-07-19 PROCEDURE — G0151 HHCP-SERV OF PT,EA 15 MIN: HCPCS

## 2019-07-19 PROCEDURE — 3331090002 HH PPS REVENUE DEBIT

## 2019-07-19 PROCEDURE — 3331090001 HH PPS REVENUE CREDIT

## 2019-07-20 VITALS
DIASTOLIC BLOOD PRESSURE: 65 MMHG | HEART RATE: 77 BPM | TEMPERATURE: 97.4 F | RESPIRATION RATE: 16 BRPM | SYSTOLIC BLOOD PRESSURE: 120 MMHG

## 2019-07-20 PROCEDURE — 3331090002 HH PPS REVENUE DEBIT

## 2019-07-20 PROCEDURE — 3331090001 HH PPS REVENUE CREDIT

## 2019-07-21 PROCEDURE — 3331090002 HH PPS REVENUE DEBIT

## 2019-07-21 PROCEDURE — 3331090001 HH PPS REVENUE CREDIT

## 2019-07-22 ENCOUNTER — HOME CARE VISIT (OUTPATIENT)
Dept: SCHEDULING | Facility: HOME HEALTH | Age: 39
End: 2019-07-22
Payer: OTHER GOVERNMENT

## 2019-07-22 ENCOUNTER — HOME CARE VISIT (OUTPATIENT)
Dept: HOME HEALTH SERVICES | Facility: HOME HEALTH | Age: 39
End: 2019-07-22
Payer: OTHER GOVERNMENT

## 2019-07-22 ENCOUNTER — TELEPHONE (OUTPATIENT)
Dept: ORTHOPEDIC SURGERY | Age: 39
End: 2019-07-22

## 2019-07-22 PROCEDURE — 3331090001 HH PPS REVENUE CREDIT

## 2019-07-22 PROCEDURE — G0157 HHC PT ASSISTANT EA 15: HCPCS

## 2019-07-22 PROCEDURE — 3331090002 HH PPS REVENUE DEBIT

## 2019-07-22 RX ORDER — SUMATRIPTAN 50 MG/1
50 TABLET, FILM COATED ORAL
Qty: 14 TAB | Refills: 0 | Status: SHIPPED | OUTPATIENT
Start: 2019-07-22

## 2019-07-22 NOTE — TELEPHONE ENCOUNTER
Patient calling a she has been having extreme migraine headaches since Sat 07/20. Patient is on Tylenol 500 mg 1 tab every 6 hrs and Percocet 1 every 6 hrs and also taking Depakote for migraines 1 daily. Please all her back today about the extreme headaches at [phone].  Surgery of ACDF C 6-7 done 07/17/2019

## 2019-07-22 NOTE — TELEPHONE ENCOUNTER
Consulted with Dr. Edil Turner. Will start Imitrex. Have her see her Neurologist. She will call today. Hydrate, water. Caffeine. Patient verbalized understanding and will call back with any issues.

## 2019-07-22 NOTE — TELEPHONE ENCOUNTER
Spoke with patient. Having terrible/ severe migraine since Saturday. Has taken Tylenol/ percocet. Was so bad Saturday she was throwing up. She started with migraines back in December and was put on Depakote. Has been on this ever since. Migrianes stopped the first of the year. Has not had once since starting Depakote. Doing well otherwise. Incisional neck pain. Right arm pain and numbness resolved. She is having some mild left arm numbness intermittently. Will consult with Dr. Heather Morrison.

## 2019-07-23 ENCOUNTER — HOME CARE VISIT (OUTPATIENT)
Dept: SCHEDULING | Facility: HOME HEALTH | Age: 39
End: 2019-07-23
Payer: OTHER GOVERNMENT

## 2019-07-23 VITALS
DIASTOLIC BLOOD PRESSURE: 76 MMHG | OXYGEN SATURATION: 98 % | TEMPERATURE: 98.2 F | SYSTOLIC BLOOD PRESSURE: 124 MMHG | HEART RATE: 70 BPM | RESPIRATION RATE: 17 BRPM

## 2019-07-23 PROCEDURE — 3331090002 HH PPS REVENUE DEBIT

## 2019-07-23 PROCEDURE — 3331090001 HH PPS REVENUE CREDIT

## 2019-07-23 PROCEDURE — G0157 HHC PT ASSISTANT EA 15: HCPCS

## 2019-07-23 NOTE — TELEPHONE ENCOUNTER
Called patient. Started MDP, still having pain. Going to give it a few days. May call Thursday and to be seen Friday.

## 2019-07-24 ENCOUNTER — HOME CARE VISIT (OUTPATIENT)
Dept: SCHEDULING | Facility: HOME HEALTH | Age: 39
End: 2019-07-24
Payer: OTHER GOVERNMENT

## 2019-07-24 VITALS
TEMPERATURE: 98.3 F | SYSTOLIC BLOOD PRESSURE: 135 MMHG | OXYGEN SATURATION: 100 % | DIASTOLIC BLOOD PRESSURE: 85 MMHG | HEART RATE: 72 BPM

## 2019-07-24 VITALS
HEART RATE: 81 BPM | OXYGEN SATURATION: 99 % | SYSTOLIC BLOOD PRESSURE: 125 MMHG | DIASTOLIC BLOOD PRESSURE: 83 MMHG | TEMPERATURE: 97.7 F

## 2019-07-24 PROCEDURE — 3331090002 HH PPS REVENUE DEBIT

## 2019-07-24 PROCEDURE — 3331090001 HH PPS REVENUE CREDIT

## 2019-07-24 PROCEDURE — G0157 HHC PT ASSISTANT EA 15: HCPCS

## 2019-07-25 ENCOUNTER — HOME CARE VISIT (OUTPATIENT)
Dept: SCHEDULING | Facility: HOME HEALTH | Age: 39
End: 2019-07-25
Payer: OTHER GOVERNMENT

## 2019-07-25 VITALS
TEMPERATURE: 98.6 F | OXYGEN SATURATION: 100 % | HEART RATE: 80 BPM | SYSTOLIC BLOOD PRESSURE: 124 MMHG | DIASTOLIC BLOOD PRESSURE: 86 MMHG

## 2019-07-25 PROCEDURE — 3331090001 HH PPS REVENUE CREDIT

## 2019-07-25 PROCEDURE — 3331090002 HH PPS REVENUE DEBIT

## 2019-07-25 PROCEDURE — G0157 HHC PT ASSISTANT EA 15: HCPCS

## 2019-07-26 ENCOUNTER — OFFICE VISIT (OUTPATIENT)
Dept: ORTHOPEDIC SURGERY | Age: 39
End: 2019-07-26

## 2019-07-26 ENCOUNTER — HOME CARE VISIT (OUTPATIENT)
Dept: SCHEDULING | Facility: HOME HEALTH | Age: 39
End: 2019-07-26
Payer: OTHER GOVERNMENT

## 2019-07-26 DIAGNOSIS — Z98.1 S/P CERVICAL SPINAL FUSION: Primary | ICD-10-CM

## 2019-07-26 DIAGNOSIS — M50.20 HNP (HERNIATED NUCLEUS PULPOSUS), CERVICAL: ICD-10-CM

## 2019-07-26 PROCEDURE — 3331090001 HH PPS REVENUE CREDIT

## 2019-07-26 PROCEDURE — 3331090002 HH PPS REVENUE DEBIT

## 2019-07-26 PROCEDURE — G0151 HHCP-SERV OF PT,EA 15 MIN: HCPCS

## 2019-07-26 RX ORDER — OXYCODONE AND ACETAMINOPHEN 5; 325 MG/1; MG/1
1 TABLET ORAL
Qty: 20 TAB | Refills: 0 | Status: SHIPPED | OUTPATIENT
Start: 2019-07-26 | End: 2019-08-02

## 2019-07-26 NOTE — PROGRESS NOTES
[address]  [address], [address]  [facility], [address]  Phone: [phone]  Fax: [phone]  PROGRESS NOTE  Patient: Salvador Harrison                MRN: [id number]       SSN: [id number]  YOB: 1980        AGE: 35 y.o. SEX: female  There is no height or weight on file to calculate BMI. PCP: Lolita Avendano DO  07/26/19    No chief complaint on file. HISTORY OF PRESENT ILLNESS, RADIOGRAPHS, and PLAN:     HISTORY OF PRESENT ILLNESS:  Ms. Scott Patton returns today. She is one week out from her cervical decompression and fusion. After a few days postop, she developed some contralateral arm symptoms, not constant but episodic pseudo radicular symptoms. She has normal strength and sensation. She has no swallowing problems. Her wound is benign. X-rays demonstrate alignment and fixation. Her preoperative radicular pain is gone. She has had onset of a return of some migrainous symptoms that she has had chronically. Her wound is dry. She is eating and drinking fine. ASSESSMENT/PLAN: At this point, I reassured her that things are normal.  I think she had a collapsed disc space that has been restored to a normal height. She may have had some irritability of the contralateral foramina or nerve root. These are not constant symptoms. She has no fixed neurology. I think it is at this point something we just have to observe and give time. We will see her back in one week as per normal.  Obviously, if her symptoms are persistent, we will obtain studies, but at this point, I would recommend simple observation. I do not think further interventions are required at this moment. cc: Lolita Avendano DO         Past Medical History:   Diagnosis Date    Fibromyalgia     Migraines        History reviewed. No pertinent family history.     Current Outpatient Medications   Medication Sig Dispense Refill    SUMAtriptan (IMITREX) 50 mg tablet Take 1 Tab by mouth daily as needed for Migraine. 14 Tab 0    divalproex ER (DEPAKOTE ER) 500 mg ER tablet Take 500 mg by mouth daily.  TYLENOL EXTRA STRENGTH 500 mg tablet Take 500 mg by mouth two (2) times a day. 2 tablets      cyclobenzaprine (FLEXERIL) 10 mg tablet Take 10 mg by mouth two (2) times a day.  VOLTAREN 1 % gel Apply 2 g to affected area daily.       lidocaine (LIDODERM) 5 %          Allergies   Allergen Reactions    Iodinated Contrast- Oral And Iv Dye Hives     Verified with pt, no issues with topical     Penicillins Hives    Shellfish Derived Hives       Past Surgical History:   Procedure Laterality Date    HX ADENOIDECTOMY      HX APPENDECTOMY      HX  SECTION      x3    HX TONSILLECTOMY      IR CHOLECYSTOSTOMY PERCUTANEOUS         Past Medical History:   Diagnosis Date    Fibromyalgia     Migraines        Social History     Socioeconomic History    Marital status:      Spouse name: Not on file    Number of children: Not on file    Years of education: Not on file    Highest education level: Not on file   Occupational History    Not on file   Social Needs    Financial resource strain: Not on file    Food insecurity:     Worry: Not on file     Inability: Not on file    Transportation needs:     Medical: Not on file     Non-medical: Not on file   Tobacco Use    Smoking status: Never Smoker    Smokeless tobacco: Never Used   Substance and Sexual Activity    Alcohol use: Not Currently    Drug use: Not Currently    Sexual activity: Not on file   Lifestyle    Physical activity:     Days per week: Not on file     Minutes per session: Not on file    Stress: Not on file   Relationships    Social connections:     Talks on phone: Not on file     Gets together: Not on file     Attends [employer] service: Not on file     Active member of club or organization: Not on file     Attends meetings of clubs or organizations: Not on file     Relationship status: Not on file    Intimate partner violence:     Fear of current or ex partner: Not on file     Emotionally abused: Not on file     Physically abused: Not on file     Forced sexual activity: Not on file   Other Topics Concern     Service Not Asked    Blood Transfusions Not Asked    Caffeine Concern Not Asked    Occupational Exposure Not Asked    Hobby Hazards Not Asked    Sleep Concern Not Asked    Stress Concern Not Asked    Weight Concern Not Asked    Special Diet Not Asked    Back Care Not Asked    Exercise Not Asked    Bike Helmet Not Asked   [address] Not Asked    Self-Exams Not Asked   Social History Narrative    Not on file         REVIEW OF SYSTEMS:   CONSTITUTIONAL SYMPTOMS:  Negative. EYES:  Negative. EARS, NOSE, THROAT AND MOUTH:  Negative. CARDIOVASCULAR:  Negative. RESPIRATORY:  Negative. GENITOURINARY: Per HPI. GASTROINTESTINAL:  Per HPI. INTEGUMENTARY (SKIN AND/OR BREAST):  Negative. MUSCULOSKELETAL: Per HPI.   ENDOCRINE/RHEUMATOLOGIC:  Negative. NEUROLOGICAL:  Per HPI. HEMATOLOGIC/LYMPHATIC:  Negative. ALLERGIC/IMMUNOLOGIC:  Negative. PSYCHIATRIC:  Negative. PHYSICAL EXAMINATION:   There were no vitals taken for this visit. PAIN SCALE: /10    CONSTITUTIONAL: The patient is in no apparent distress and is alert and oriented x 3. HEENT: Normocephalic. Hearing grossly intact. NECK: Supple and symmetric. no tenderness, or masses were felt. RESPIRATORY: No labored breathing. CARDIOVASCULAR: The carotid pulses were normal. Peripheral pulses were 2+. CHEST: Normal AP diameter and normal contour without any kyphoscoliosis. LYMPHATIC: No lymphadenopathy was appreciated in the neck, axillae or groin. SKIN:  Incision healing well, no drainage, no erythema, no hernia, no seroma, no swelling, no dehiscence, incision well approximated. Negative for scars, rashes, lesions, or ulcers on the right upper, right lower, left upper, left lower and trunk. NEUROLOGICAL: Alert and oriented x 3. Ambulation with single point cane. FWB. EXTREMITIES: See musculoskeletal.  MUSCULOSKELETAL:   Head and Neck: Neck pain radiating into LUE. Negative for misalignment, asymmetry, crepitation, defects, tenderness masses or effusions.  Left Upper Extremity: Radicular pain and numbness. Inspection, percussion and palpation performed. Valverdes sign is negative.  Right Upper Extremity: Inspection, percussion and palpation performed. Valverdes sign is negative.  Spine, Ribs and Pelvis: Inspection, percussion and palpation performed. Negative for misalignment, asymmetry, crepitation, defects, tenderness masses or effusions.  Left Lower Extremity: Inspection, percussion and palpation performed. Negative straight leg raise.  Right Lower Extremity: Inspection, percussion and palpation performed. Negative straight leg raise. SPINE EXAM:     Cervical spine: Neck is midline. Normal muscle tone. No focal atrophy is noted. Lumbar spine: No rash, ecchymosis, or gross obliquity. No focal atrophy is noted. ASSESSMENT    ICD-10-CM ICD-9-CM    1. S/P cervical spinal fusion Z98.1 V45.4 AMB POC XRAY, SPINE, CERVICAL; 2 OR 3   2. HNP (herniated nucleus pulposus), cervical M50.20 722.0 AMB POC XRAY, SPINE, CERVICAL; 2 OR 3       Written by Adrienne Kimble, as dictated by Seda Cárdenas MD.    I, Dr. Seda Cárdenas MD, confirm that all documentation is accurate.

## 2019-07-27 VITALS
DIASTOLIC BLOOD PRESSURE: 80 MMHG | TEMPERATURE: 97.4 F | HEART RATE: 98 BPM | SYSTOLIC BLOOD PRESSURE: 110 MMHG | RESPIRATION RATE: 14 BRPM | OXYGEN SATURATION: 97 %

## 2019-08-07 ENCOUNTER — OFFICE VISIT (OUTPATIENT)
Dept: ORTHOPEDIC SURGERY | Age: 39
End: 2019-08-07

## 2019-08-07 VITALS
RESPIRATION RATE: 18 BRPM | HEART RATE: 100 BPM | TEMPERATURE: 97.9 F | OXYGEN SATURATION: 100 % | BODY MASS INDEX: 33.27 KG/M2 | HEIGHT: 67 IN | SYSTOLIC BLOOD PRESSURE: 108 MMHG | WEIGHT: 212 LBS | DIASTOLIC BLOOD PRESSURE: 72 MMHG

## 2019-08-07 DIAGNOSIS — Z98.1 S/P CERVICAL SPINAL FUSION: ICD-10-CM

## 2019-08-07 DIAGNOSIS — M50.20 HNP (HERNIATED NUCLEUS PULPOSUS), CERVICAL: Primary | ICD-10-CM

## 2019-08-07 NOTE — PROGRESS NOTES
[address]  [address], [address]  [facility], [address]  Phone: [phone]  Fax: [phone]  PROGRESS NOTE-Post-op  Patient: Romayne Square                MRN: [id number]       SSN: [id number]  YOB: 1980        AGE: 35 y.o. SEX: female  Body mass index is 33.2 kg/m². PCP: Alfredo Robbins DO  08/07/19    Chief Complaint   Patient presents with    Neck Pain     neck pain       HISTORY OF PRESENT ILLNESS:  Romayne Square is a 35 y.o. female with history of neck pain and arm pain who had ACDF C6-7 surgery 2 weeks for HNP on the Right. She saw Dr. Viola Parker at 1 week post-op for some contralateral arm pain. She comes in reporting that the LUE pain resolved and the RUE pain is gone. Her pain is now some myofascial neck pain. Patient denies any fevers, wound drainage, bladder/bowel dysfunction, new onset weakness, saddle paresthesia, new onset radiculopathy or nerve pain, or other neurological deficits. Reports that she is swallowing without difficulty. Pt desires to continue with evaluation of neck pain and postoperative care. Current Medications: has Flexeril at home     ASSESSMENT   Diagnoses and all orders for this visit:    1. HNP (herniated nucleus pulposus), cervical    2. S/P cervical spinal fusion         IMPRESSION AND PLAN:  This is a pt who had a ACDF surgery 2 weeks ago. She is doing well w/ resolved arm pain. Her incision is healed. Anjali Cesar 1) Pt was given information on s/p cervical fusion post-operative and wound care. 2) Reviewed activity and to avoid NSAIDs x 3 months. 3) Start gentle ROM  4) PRN Flexeril and Tylenol for pain  4) Ms. Letitia Hagen has a reminder for a \"due or due soon\" health maintenance. I have asked that she contact her primary care provider, Alfredo Robbins DO, for follow-up on this health maintenance.   5) We have informed the patient to notify us for immediate appointment if he has any worsening neurogical symptoms or if an emergency situation presents, then call 911  6)  demonstrated consistency with prescribing. 7) Pt will follow-up in 4 wks w/ Dr Elvin Dobbins. SUBJECTIVE    Smoking Status non-smoker  Work does admin work, ok to return w/ restrictions    Pain Scale: 0 - No pain/10    Pain Assessment  8/7/2019   Location of Pain Neck   Location Modifiers -   Severity of Pain 0   Quality of Pain Other (Comment)   Quality of Pain Comment -   Duration of Pain Other (Comment)   Frequency of Pain Other (Comment)   Aggravating Factors Other (Comment)   Aggravating Factors Comment -   Limiting Behavior No   Relieving Factors Other (Comment)   Relieving Factors Comment -   Result of Injury No           REVIEW OF SYSTEMS  Constitutional: Negative for fever, chills, or weight change. Respiratory: Negative for cough or shortness of breath. Cardiovascular: Negative for chest pain or palpitations. Gastrointestinal: Negative for incontinence, acid reflux, change in bowel habits, or constipation. Genitourinary: Negative for incontinence, dysuria and flank pain. Musculoskeletal: Positive for neck pain. See HPI. Skin: Negative for rash. Neurological:no radiculopathy. See HPI. Endo/Heme/Allergies: Negative. Psychiatric/Behavioral: Negative. PHYSICAL EXAMINATION  Visit Vitals  /72   Pulse 100   Temp 97.9 °F (36.6 °C)   Resp 18   Ht 5' 7\" (1.702 m)   Wt 212 lb (96.2 kg)   SpO2 100%   BMI 33.20 kg/m²         Accompanied by spouse and child. Constitutional:  Well developed, well nourished, in no acute distress. Psychiatric: Affect and mood are appropriate. Integumentary: No rashes or abrasions noted on exposed areas. Wound: anterior neck Incision healing well, no drainage, no erythema, no hernia, no seroma, no swelling, no dehiscence, incision well approximated. Cardiovascular/Peripheral Vascular: +2 radial & pedal pulses. No peripheral edema is noted.   Lymphatic:  No evidence of lymphedema. No cervical lymphadenopathy. SPINE/MUSCULOSKELETAL EXAM    Cervical spine:    Neck is midline. Normal muscle tone. No focal atrophy is noted. Shoulder ROM intact. Neck ROM not tested. Tenderness to palpation none. Negative Valverde's sign. Sensation grossly intact to light touch. MOTOR:     Biceps Triceps Deltoids Wrist Ext Wrist Flex Hand Intrin   Right 5/5 5/5 5/5 5/5 5/5 5/5   Left 5/5 5/5 5/5 5/5 5/5 5/5       normal gait and station    Ambulation without assistive device. FWB. PAST MEDICAL HISTORY   Past Medical History:   Diagnosis Date    Fibromyalgia     Migraines        Past Surgical History:   Procedure Laterality Date    HX ADENOIDECTOMY      HX APPENDECTOMY      HX CERVICAL FUSION  2019    HX  SECTION      x3    HX TONSILLECTOMY      IR CHOLECYSTOSTOMY PERCUTANEOUS     . MEDICATIONS      Current Outpatient Medications   Medication Sig Dispense Refill    methylPREDNISolone (MEDROL) 4 mg tab Take 4 mg by mouth daily. 6 pills first day 2 breakfast, 1 lunch, 1 dinner  then 2 at night  5 pills the second  day,  1 breakfast , 1 lunch and 1 dinnder, 2 at night  4 pills the thrird day,  1 breakfast, 1 lunch, 1 dinner and 1 at night  3 pills the fourth day  1 breakfast, 1 lunch and 1 bed time  2 pills the  fifth day,  1 breakfast and one at night  1 pill the 6th day,  1 breakfast.  Indications: any disease following trauma involving joint cartilage      SUMAtriptan (IMITREX) 50 mg tablet Take 1 Tab by mouth daily as needed for Migraine. 14 Tab 0    divalproex ER (DEPAKOTE ER) 500 mg ER tablet Take 500 mg by mouth daily.  TYLENOL EXTRA STRENGTH 500 mg tablet Take 500 mg by mouth two (2) times a day. 2 tablets      cyclobenzaprine (FLEXERIL) 10 mg tablet Take 10 mg by mouth two (2) times a day.  VOLTAREN 1 % gel Apply 2 g to affected area daily.       lidocaine (LIDODERM) 5 %           ALLERGIES    Allergies   Allergen Reactions    Iodinated Contrast Media Hives     Verified with pt, no issues with topical     Penicillin Other (comments)    Penicillins Hives    Shellfish Derived Hives          SOCIAL HISTORY    Social History     Socioeconomic History    Marital status:      Spouse name: Not on file    Number of children: Not on file    Years of education: Not on file    Highest education level: Not on file   Occupational History    Not on file   Social Needs    Financial resource strain: Not on file    Food insecurity:     Worry: Not on file     Inability: Not on file    Transportation needs:     Medical: Not on file     Non-medical: Not on file   Tobacco Use    Smoking status: Never Smoker    Smokeless tobacco: Never Used   Substance and Sexual Activity    Alcohol use: Not Currently    Drug use: Not Currently    Sexual activity: Not on file   Lifestyle    Physical activity:     Days per week: Not on file     Minutes per session: Not on file    Stress: Not on file   Relationships    Social connections:     Talks on phone: Not on file     Gets together: Not on file     Attends Protestant service: Not on file     Active member of club or organization: Not on file     Attends meetings of clubs or organizations: Not on file     Relationship status: Not on file    Intimate partner violence:     Fear of current or ex partner: Not on file     Emotionally abused: Not on file     Physically abused: Not on file     Forced sexual activity: Not on file   Other Topics Concern     Service Not Asked    Blood Transfusions Not Asked    Caffeine Concern Not Asked    Occupational Exposure Not Asked   Shelvy Go Hazards Not Asked    Sleep Concern Not Asked    Stress Concern Not Asked    Weight Concern Not Asked    Special Diet Not Asked    Back Care Not Asked    Exercise Not Asked    Bike Helmet Not Asked   [address] Not Asked    Self-Exams Not Asked   Social History Narrative    Not on file       FAMILY HISTORY  No family history on file.      Karie Lockhart NP

## 2019-08-07 NOTE — LETTER
NOTIFICATION RETURN TO WORK / SCHOOL 
 
8/7/2019 10:19 AM 
 
Ms. Surendra Sinha [id number] [phone] To Whom It May Concern: 
 
Uriah Adames is currently under the care of [address] [place] [address]. She will return to work/school on: 8/8/19 with the following restrictions; no lifting greater than 15lbs, avoid pushing/pulling/ and overhead activity. Please provide her w/ breaks for periods of gentle stretching. If there are questions or concerns please have the patient contact our office. Sincerely, Bentley Cheng NP

## 2019-08-07 NOTE — PATIENT INSTRUCTIONS
Cervical Spinal Fusion: What to Expect at Home  Your Recovery    After surgery, you can expect your neck to feel stiff and sore. This should improve in the weeks after surgery. You may have trouble sitting or standing in one position for very long and may need pain medicine in the weeks after your surgery. You may need to wear a neck brace for a while. It may take 4 to 6 weeks to get back to your usual activities, but it may depend on what kind of surgery you had. Your doctor may advise you to work with a physical therapist to strengthen the muscles around your neck and back. This care sheet gives you a general idea about how long it will take for you to recover. But each person recovers at a different pace. Follow the steps below to get better as quickly as possible. How can you care for yourself at home? Activity    · Rest when you feel tired. Getting enough sleep will help you recover.     · Try to walk each day. Start by walking a little more than you did the day before. Bit by bit, increase the amount you walk. Walking boosts blood flow and helps prevent pneumonia and constipation. Walking may also decrease your muscle soreness after surgery.     · Follow your doctor's directions about not lifting anything that would strain your neck and back. This may include a child, heavy grocery bags and milk containers, a heavy briefcase or backpack, cat litter or dog food bags, or a vacuum .     · Avoid strenuous activities, such as bicycle riding, jogging, weightlifting, or aerobic exercise, until your doctor says it is okay.     · Do not drive for 2 to 4 weeks after your surgery or until your doctor says it is okay.     · Avoid taking long car trips for 2 to 4 weeks after surgery. Your neck may become tired and painful from sitting too long in one position.     · You will probably need to take 4 to 6 weeks off from work.  It depends on the type of work you do and how you feel.     · You may have sex as soon as you feel able, but avoid positions that put stress on your neck or cause pain. Diet    · You can eat your normal diet. If your stomach is upset, try bland, low-fat foods like plain rice, broiled chicken, toast, and yogurt.     · Drink plenty of fluids. If you have kidney, heart, or liver disease and have to limit fluids, talk with your doctor before you increase the amount of fluids you drink.     · You may notice that your bowel movements are not regular right after your surgery. This is common. Try to avoid constipation and straining with bowel movements. You may want to take a fiber supplement every day. If you have not had a bowel movement after a couple of days, ask your doctor about taking a mild laxative. Medicines    · Your doctor will tell you if and when you can restart your medicines. He or she will also give you instructions about taking any new medicines.     · If you take blood thinners, such as warfarin (Coumadin), clopidogrel (Plavix), or aspirin, be sure to talk to your doctor. He or she will tell you if and when to start taking those medicines again. Make sure that you understand exactly what your doctor wants you to do.     · Be safe with medicines. Take pain medicines exactly as directed. ? If the doctor gave you a prescription medicine for pain, take it as prescribed. ? If you are not taking a prescription pain medicine, ask your doctor if you can take an over-the-counter medicine.     · If your doctor prescribed antibiotics, take them as directed. Do not stop taking them just because you feel better. You need to take the full course of antibiotics.     · If you think your pain pill is making you sick to your stomach:  ? Take your pills after meals (unless your doctor has told you not to). ? Ask your doctor for a different pain pill. Incision care    · You will be given specific instructions about how to care for the cut (incision) the doctor made.  The instructions will depend on the type of materials used to close the cut. Exercise    · Do exercises as instructed by your doctor or physical therapist to improve your strength and flexibility. Other instructions    · To reduce stiffness and help sore muscles, use a warm water bottle, a heating pad set on low, or a warm cloth on your neck. Do not put heat right over the incision. Do not go to sleep with a heating pad on your skin. Follow-up care is a key part of your treatment and safety. Be sure to make and go to all appointments, and call your doctor if you are having problems. It's also a good idea to know your test results and keep a list of the medicines you take. When should you call for help? Call 911 anytime you think you may need emergency care. For example, call if:    · You passed out (lost consciousness).     · You have chest pain, are short of breath, or cough up blood.     · You are unable to move an arm or a leg at all.   [facility] your doctor now or seek immediate medical care if:    · You have pain that does not get better after you take pain medicine.     · You have loose stitches, or your incision comes open.     · Bright red blood has soaked through the bandage over your incision.     · You have signs of a blood clot in your leg (called a deep vein thrombosis), such as:  ? Pain in your calf, back of the knee, thigh, or groin. ? Redness or swelling in your leg.     · You have signs of infection, such as:  ? Increased pain, swelling, warmth, or redness. ? Red streaks leading from the incision. ? Pus draining from the incision. ? A fever.     · You have new or worse symptoms in your arms, legs, chest, belly, or buttocks. Symptoms may include:  ? Numbness or tingling. ? Weakness. ? Pain.     · You lose bladder or bowel control.    Watch closely for any changes in your health, and be sure to contact your doctor if:    · You do not get better as expected. Where can you learn more?   Go to http://tejal.info/. Enter W378 in the search box to learn more about \"Cervical Spinal Fusion: What to Expect at Home. \"  Current as of: September 20, 2018  Content Version: 12.1  © 0145-1174 Healthwise, Incorporated. Care instructions adapted under license by [employer] (which disclaims liability or warranty for this information). If you have questions about a medical condition or this instruction, always ask your healthcare professional. [address] any warranty or liability for your use of this information.

## 2019-09-03 ENCOUNTER — OFFICE VISIT (OUTPATIENT)
Dept: ORTHOPEDIC SURGERY | Age: 39
End: 2019-09-03

## 2019-09-03 VITALS
TEMPERATURE: 97.9 F | SYSTOLIC BLOOD PRESSURE: 130 MMHG | RESPIRATION RATE: 18 BRPM | WEIGHT: 213.6 LBS | HEART RATE: 85 BPM | BODY MASS INDEX: 33.45 KG/M2 | DIASTOLIC BLOOD PRESSURE: 79 MMHG | OXYGEN SATURATION: 100 %

## 2019-09-03 DIAGNOSIS — Z98.1 S/P CERVICAL SPINAL FUSION: ICD-10-CM

## 2019-09-03 DIAGNOSIS — M50.20 HNP (HERNIATED NUCLEUS PULPOSUS), CERVICAL: ICD-10-CM

## 2019-09-03 DIAGNOSIS — R13.10 DYSPHAGIA, UNSPECIFIED TYPE: Primary | ICD-10-CM

## 2019-09-03 NOTE — PROGRESS NOTES
Lavon Joaquinadiamond presents today for   Chief Complaint   Patient presents with    Surgical Follow-up     6 weeks post op       Is someone accompanying this pt? Yes; Male Friend    Is the patient using any DME equipment during OV? No    Depression Screening:  3 most recent PHQ Screens 9/3/2019   Little interest or pleasure in doing things Not at all   Feeling down, depressed, irritable, or hopeless Not at all   Total Score PHQ 2 0       Learning Assessment:  Learning Assessment 9/3/2019   PRIMARY LEARNER Patient   PRIMARY LANGUAGE ENGLISH   LEARNER PREFERENCE PRIMARY LISTENING   ANSWERED BY Patient   RELATIONSHIP SELF       Fall Risk  Fall Risk Assessment, last 12 mths 9/3/2019   Able to walk? Yes   Fall in past 12 months? No       Coordination of Care:  1. Have you been to the ER, urgent care clinic since your last visit? No  Hospitalized since your last visit? No    2. Have you seen or consulted any other health care providers outside of the [address] [place] [address] since your last visit? No Include any pap smears or colon screening. No    Patient denies pain but states she does have trouble swallowing.

## 2019-09-03 NOTE — LETTER
NOTIFICATION RETURN TO WORK / SCHOOL 
 
9/3/2019 11:05 AM 
 
Ms. Tanika Collins [address] [address] [address] To Whom It May Concern: 
 
Morris Quintanillas is was seen today at [address] [place] [address]. If there are questions or concerns please have the patient contact our office.  
 
 
 
Sincerely, 
 
 
Leny Damon MD

## 2019-09-03 NOTE — PATIENT INSTRUCTIONS
Learning About Swallowing Problems  What are swallowing problems? Certain health problems that affect the nervous system can cause trouble swallowing. These conditions include stroke, ALS (also known as Lauren Gehrig's disease), Parkinson's disease, and multiple sclerosis. The muscles and nerves that help move food through the throat and esophagus may not work right. Growths, such as cancer, and other problems with your esophagus can also make it hard to swallow. The esophagus is the tube that leads from your throat to your stomach. How are swallowing problems diagnosed? A doctor or speech therapist will examine you to check for swallowing problems. You may get swallowing tests to check how well your throat muscles work. For these tests, you swallow a special liquid that helps the doctor see your throat and esophagus on an X-ray or video screen. Other tests use a thin, flexible tube called a scope to check for problems with your esophagus. The doctor puts the scope in your mouth and down your throat to look at your esophagus. What are the symptoms? Symptoms of swallowing problems may include:  · Trouble getting food or liquids to go down on the first try. · Gagging, choking, or coughing when you swallow. · Having food or liquids come back up through your throat, mouth, or nose after you swallow. · Feeling like foods or liquids are stuck in some part of your throat or chest.  · Pain when you swallow. How are swallowing problems treated? How swallowing problems are treated depends on the cause. The main goals of treatment will be to help you eat and swallow safely and get good nutrition. This is important for your health and quality of life. You may learn exercises to train your throat muscles to work together so you're able to swallow better. Learning certain ways to put food in your mouth or to position your head while eating may also help.   Your doctor or a speech therapist may recommend changes to your diet to help make it easier to swallow. You may need to avoid certain foods or liquids. You also may need to change the thickness of foods or liquids in your diet. To eat and swallow safely, follow any instructions you get from your doctor or therapist. These ideas may help:  · Sit upright when eating, drinking, and taking pills. · Take small bites of food. Chew completely and swallow before taking another bite. · Take small sips of liquids. · If eating makes you tired, eat smaller but more frequent meals. · Tip your chin down when there is food in your mouth. Where can you learn more? Go to http://rodrigo-denice.info/. Enter 146 [id number] 1350 in the search box to learn more about \"Learning About Swallowing Problems. \"  Current as of: September 23, 2018  Content Version: 12.1  © 8541-5099 Healthwise, Incorporated. Care instructions adapted under license by [employer] (which disclaims liability or warranty for this information). If you have questions about a medical condition or this instruction, always ask your healthcare professional. [address] [address] any warranty or liability for your use of this information.

## 2019-09-03 NOTE — PROGRESS NOTES
[address]  [address]  [address]  Phone: [phone]  Fax: [phone]  PROGRESS NOTE  Patient: Boy Lockett                MRN: [id number]       SSN: [id number]  YOB: 1980        AGE: 35 y.o. SEX: female  Body mass index is 33.45 kg/m². PCP: Merna Angeles DO  09/03/19    Chief Complaint   Patient presents with    Surgical Follow-up     6 weeks post op       HISTORY OF PRESENT ILLNESS, RADIOGRAPHS, and PLAN:     HISTORY OF PRESENT ILLNESS:  Ms. Patricia Moreno is seen today. She is six weeks out from her cervical discectomy and fusion. She is without neck pain or radiating arm pain. She is asymptomatic. The only issue she is having is some dysphagia. She is having large solid food dysphagia, which she feels gets stuck in her upper neck. Actually, the sensation is no where near where the actual surgery is. So, she is having some mechanical/dysphagia type symptoms. RADIOGRAPHS:  Her x-rays demonstrate appropriate alignment and fixation. ASSESSMENT/PLAN: At this time, I am going to have her see our speech therapist and have him do a swallow evaluation on her. I will see her back in six weeks or sooner if the speech and swallow people have any issue. I discussed with her the natural history of dysphagia and dysphagia evaluations. If there are any findings by the speech therapist in a modified barium swallow, I will have her be seen by Gastroenterology or ENT, but she is otherwise asymptomatic and generally, these issues of dysphagia resolve spontaneously on their own. cc: Merna Angeles DO         Past Medical History:   Diagnosis Date    Fibromyalgia     Migraines        No family history on file. Current Outpatient Medications   Medication Sig Dispense Refill    SUMAtriptan (IMITREX) 50 mg tablet Take 1 Tab by mouth daily as needed for Migraine.  14 Tab 0    TYLENOL EXTRA STRENGTH 500 mg tablet Take 500 mg by mouth two (2) times a day. 2 tablets      cyclobenzaprine (FLEXERIL) 10 mg tablet Take 10 mg by mouth two (2) times a day.  VOLTAREN 1 % gel Apply 2 g to affected area daily.  lidocaine (LIDODERM) 5 %       methylPREDNISolone (MEDROL) 4 mg tab Take 4 mg by mouth daily. 6 pills first day 2 breakfast, 1 lunch, 1 dinner  then 2 at night  5 pills the second  day,  1 breakfast , 1 lunch and 1 dinnder, 2 at night  4 pills the thrird day,  1 breakfast, 1 lunch, 1 dinner and 1 at night  3 pills the fourth day  1 breakfast, 1 lunch and 1 bed time  2 pills the  fifth day,  1 breakfast and one at night  1 pill the 6th day,  1 breakfast.  Indications: any disease following trauma involving joint cartilage      divalproex ER (DEPAKOTE ER) 500 mg ER tablet Take 500 mg by mouth daily.          Allergies   Allergen Reactions    Iodinated Contrast Media Hives     Verified with pt, no issues with topical     Penicillin Other (comments)    Penicillins Hives    Shellfish Derived Hives       Past Surgical History:   Procedure Laterality Date    HX ADENOIDECTOMY      HX APPENDECTOMY      HX CERVICAL FUSION  2019    HX  SECTION      x3    HX TONSILLECTOMY      IR CHOLECYSTOSTOMY PERCUTANEOUS         Past Medical History:   Diagnosis Date    Fibromyalgia     Migraines        Social History     Socioeconomic History    Marital status:      Spouse name: Not on file    Number of children: Not on file    Years of education: Not on file    Highest education level: Not on file   Occupational History    Not on file   Social Needs    Financial resource strain: Not on file    Food insecurity:     Worry: Not on file     Inability: Not on file    Transportation needs:     Medical: Not on file     Non-medical: Not on file   Tobacco Use    Smoking status: Never Smoker    Smokeless tobacco: Never Used   Substance and Sexual Activity    Alcohol use: Not Currently    Drug use: Not Currently    Sexual activity: Not on file   Lifestyle    Physical activity:     Days per week: Not on file     Minutes per session: Not on file    Stress: Not on file   Relationships    Social connections:     Talks on phone: Not on file     Gets together: Not on file     Attends Adventism service: Not on file     Active member of club or organization: Not on file     Attends meetings of clubs or organizations: Not on file     Relationship status: Not on file    Intimate partner violence:     Fear of current or ex partner: Not on file     Emotionally abused: Not on file     Physically abused: Not on file     Forced sexual activity: Not on file   Other Topics Concern     Service Not Asked    Blood Transfusions Not Asked    Caffeine Concern Not Asked    Occupational Exposure Not Asked   [place] Hazards Not Asked    Sleep Concern Not Asked    Stress Concern Not Asked    Weight Concern Not Asked    Special Diet Not Asked    Back Care Not Asked    Exercise Not Asked    Bike Helmet Not Asked   [address] Not Asked    Self-Exams Not Asked   Social History Narrative    Not on file         REVIEW OF SYSTEMS:   CONSTITUTIONAL SYMPTOMS:  Negative. EYES:  Negative. EARS, NOSE, THROAT AND MOUTH:  Negative. CARDIOVASCULAR:  Negative. RESPIRATORY:  Negative. GENITOURINARY: Per HPI. GASTROINTESTINAL:  Per HPI. INTEGUMENTARY (SKIN AND/OR BREAST):  Negative. MUSCULOSKELETAL: Per HPI.   ENDOCRINE/RHEUMATOLOGIC:  Negative. NEUROLOGICAL:  Per HPI. HEMATOLOGIC/LYMPHATIC:  Negative. ALLERGIC/IMMUNOLOGIC:  Negative. PSYCHIATRIC:  Negative. PHYSICAL EXAMINATION:   Visit Vitals  /79   Pulse 85   Temp 97.9 °F (36.6 °C)   Resp 18   Wt 213 lb 9.6 oz (96.9 kg)   SpO2 100%   BMI 33.45 kg/m²    PAIN SCALE: 0 - No pain/10    CONSTITUTIONAL: The patient is in no apparent distress and is alert and oriented x 3. HEENT: Normocephalic. Hearing grossly intact.   NECK: Supple and symmetric. no tenderness, or masses were felt. RESPIRATORY: No labored breathing. CARDIOVASCULAR: The carotid pulses were normal. Peripheral pulses were 2+. CHEST: Normal AP diameter and normal contour without any kyphoscoliosis. LYMPHATIC: No lymphadenopathy was appreciated in the neck, axillae or groin. SKIN:  Negative for scars, rashes, lesions, or ulcers on the right upper, right lower, left upper, left lower and trunk. NEUROLOGICAL: Alert and oriented x 3. Ambulation without assistive device. FWB. EXTREMITIES: See musculoskeletal.  MUSCULOSKELETAL:   Head and Neck:  Negative for misalignment, asymmetry, crepitation, defects, tenderness masses or effusions.  Left Upper Extremity: Inspection, percussion and palpation performed. Valverdes sign is negative.  Right Upper Extremity: Inspection, percussion and palpation performed. Valverdes sign is negative.  Spine, Ribs and Pelvis: Inspection, percussion and palpation performed. Negative for misalignment, asymmetry, crepitation, defects, tenderness masses or effusions.  Left Lower Extremity: Inspection, percussion and palpation performed. Negative straight leg raise.  Right Lower Extremity: Inspection, percussion and palpation performed. Negative straight leg raise. SPINE EXAM:     Cervical spine: Neck is midline. Normal muscle tone. No focal atrophy is noted. ASSESSMENT    ICD-10-CM ICD-9-CM    1. Dysphagia, unspecified type R13.10 787.20 REFERRAL TO SPEECH THERAPY   2. S/P cervical spinal fusion Z98.1 V45.4 AMB POC XRAY, SPINE, CERVICAL; 2 OR 3      REFERRAL TO SPEECH THERAPY   3. HNP (herniated nucleus pulposus), cervical M50.20 722.0        Written by Radha Jean, as dictated by Cortes Lizama MD.    I, Dr. Cortes Lizama MD, confirm that all documentation is accurate.

## 2019-11-15 ENCOUNTER — OFFICE VISIT (OUTPATIENT)
Dept: ORTHOPEDIC SURGERY | Age: 39
End: 2019-11-15

## 2019-11-15 VITALS
WEIGHT: 208 LBS | SYSTOLIC BLOOD PRESSURE: 126 MMHG | HEART RATE: 81 BPM | HEIGHT: 67 IN | DIASTOLIC BLOOD PRESSURE: 78 MMHG | BODY MASS INDEX: 32.65 KG/M2

## 2019-11-15 DIAGNOSIS — R20.2 PARESTHESIA OF RIGHT UPPER EXTREMITY: ICD-10-CM

## 2019-11-15 DIAGNOSIS — M54.2 NECK PAIN: ICD-10-CM

## 2019-11-15 DIAGNOSIS — R13.10 DYSPHAGIA, UNSPECIFIED TYPE: ICD-10-CM

## 2019-11-15 DIAGNOSIS — Z98.1 S/P CERVICAL SPINAL FUSION: Primary | ICD-10-CM

## 2019-11-15 DIAGNOSIS — M50.20 HNP (HERNIATED NUCLEUS PULPOSUS), CERVICAL: ICD-10-CM

## 2019-11-15 RX ORDER — GABAPENTIN 300 MG/1
300 CAPSULE ORAL 3 TIMES DAILY
Qty: 90 CAP | Refills: 0 | Status: SHIPPED | OUTPATIENT
Start: 2019-11-15 | End: 2019-12-10 | Stop reason: SDUPTHER

## 2019-11-15 RX ORDER — METHYLPREDNISOLONE 4 MG/1
TABLET ORAL
Qty: 1 DOSE PACK | Refills: 0 | Status: SHIPPED | OUTPATIENT
Start: 2019-11-15

## 2019-11-15 NOTE — PROGRESS NOTES
[address]  [address], [address]  [address]  Phone: [phone]  Fax: [phone]  PROGRESS NOTE  Patient: Zach Martinez                MRN: [id number]       SSN: [id number]  YOB: 1980        AGE: 35 y.o. SEX: female  Body mass index is 32.58 kg/m². PCP: Kodi Bae DO  11/15/19    Chief Complaint   Patient presents with    Surgical Follow-up     s/p cervical fusion       HISTORY OF PRESENT ILLNESS, RADIOGRAPHS, and PLAN:     HISTORY OF PRESENT ILLNESS:  Ms. Katerine Hill is seen today. She is three months out from her cervical decompression and fusion at C6-7. At her six-week visit, her arm pain had resolved, and she was having dysphagia. She says about three weeks ago, the arm pain began to return. It is lower in the scapula and has a diffuse aching pain down her right arm. She is quite miserable. PHYSICAL EXAMINATION:  On manual motor testing, she is normal.  She has no sensory deficit. She has full range of motion of her neck without evident pain. Her wound is healed and dry. ASSESSMENT/PLAN: Her dysphagia has improved. We had ordered a speech and swallow study the last time, but she did not show up for that. At this point, given the severity of her pain, I am going to obtain a new MRI of her neck. On x-rays, while there is appropriate alignment and fixation, there appears to have been some subsidence of the interbody graft into 7. I could imagine that could have recreated some foraminal stenosis. It is unusual for such foraminal stenosis to be so remarkable at nine weeks postop. I am going to put her on some Neurontin and a Medrol Dosepak to see if that will ease her symptoms while I await her MRI. I will see her back following her cervical MRI. cc: Kodi Bae DO         Past Medical History:   Diagnosis Date    Fibromyalgia     Migraines        History reviewed.  No pertinent family history. Current Outpatient Medications   Medication Sig Dispense Refill    cyclobenzaprine (FLEXERIL) 10 mg tablet Take 10 mg by mouth two (2) times a day.  SUMAtriptan (IMITREX) 50 mg tablet Take 1 Tab by mouth daily as needed for Migraine. (Patient not taking: Reported on 11/15/2019) 14 Tab 0    divalproex ER (DEPAKOTE ER) 500 mg ER tablet Take 500 mg by mouth daily.  TYLENOL EXTRA STRENGTH 500 mg tablet Take 500 mg by mouth two (2) times a day. 2 tablets      VOLTAREN 1 % gel Apply 2 g to affected area daily.       lidocaine (LIDODERM) 5 %          Allergies   Allergen Reactions    Iodinated Contrast Media Hives     Verified with pt, no issues with topical     Penicillin Other (comments)    Penicillins Hives    Shellfish Derived Hives       Past Surgical History:   Procedure Laterality Date    HX ADENOIDECTOMY      HX APPENDECTOMY      HX CERVICAL FUSION  2019    HX  SECTION      x3    HX TONSILLECTOMY      IR CHOLECYSTOSTOMY PERCUTANEOUS         Past Medical History:   Diagnosis Date    Fibromyalgia     Migraines        Social History     Socioeconomic History    Marital status:      Spouse name: Not on file    Number of children: Not on file    Years of education: Not on file    Highest education level: Not on file   Occupational History    Not on file   Social Needs    Financial resource strain: Not on file    Food insecurity:     Worry: Not on file     Inability: Not on file    Transportation needs:     Medical: Not on file     Non-medical: Not on file   Tobacco Use    Smoking status: Never Smoker    Smokeless tobacco: Never Used   Substance and Sexual Activity    Alcohol use: Not Currently    Drug use: Not Currently    Sexual activity: Not on file   Lifestyle    Physical activity:     Days per week: Not on file     Minutes per session: Not on file    Stress: Not on file   Relationships    Social connections:     Talks on phone: Not on file Gets together: Not on file     Attends Confucianism service: Not on file     Active member of club or organization: Not on file     Attends meetings of clubs or organizations: Not on file     Relationship status: Not on file    Intimate partner violence:     Fear of current or ex partner: Not on file     Emotionally abused: Not on file     Physically abused: Not on file     Forced sexual activity: Not on file   Other Topics Concern     Service Not Asked    Blood Transfusions Not Asked    Caffeine Concern Not Asked    Occupational Exposure Not Asked   [place] Hazards Not Asked    Sleep Concern Not Asked    Stress Concern Not Asked    Weight Concern Not Asked    Special Diet Not Asked    Back Care Not Asked    Exercise Not Asked    Bike Helmet Not Asked   [address] [facility] Not Asked    Self-Exams Not Asked   Social History Narrative    Not on file         REVIEW OF SYSTEMS:   CONSTITUTIONAL SYMPTOMS:  Negative. EYES:  Negative. EARS, NOSE, THROAT AND MOUTH:  Negative. CARDIOVASCULAR:  Negative. RESPIRATORY:  Negative. GENITOURINARY: Per HPI. GASTROINTESTINAL:  Per HPI. INTEGUMENTARY (SKIN AND/OR BREAST):  Negative. MUSCULOSKELETAL: Per HPI.   ENDOCRINE/RHEUMATOLOGIC:  Negative. NEUROLOGICAL:  Per HPI. HEMATOLOGIC/LYMPHATIC:  Negative. ALLERGIC/IMMUNOLOGIC:  Negative. PSYCHIATRIC:  Negative. PHYSICAL EXAMINATION:   Visit Vitals  /78   Pulse 81   Ht 5' 7\" (1.702 m)   Wt 208 lb (94.3 kg)   BMI 32.58 kg/m²    PAIN SCALE: 5/10    CONSTITUTIONAL: The patient is in no apparent distress and is alert and oriented x 3. HEENT: Normocephalic. Hearing grossly intact. NECK: Supple and symmetric. no tenderness, or masses were felt. RESPIRATORY: No labored breathing. CARDIOVASCULAR: The carotid pulses were normal. Peripheral pulses were 2+. CHEST: Normal AP diameter and normal contour without any kyphoscoliosis.   LYMPHATIC: No lymphadenopathy was appreciated in the neck, axillae or groin. SKIN: Incision healing well, no drainage, no erythema, no hernia, no seroma, no swelling, no dehiscence, incision well approximated. Negative for scars, rashes, lesions, or ulcers on the right upper, right lower, left upper, left lower and trunk. NEUROLOGICAL: Alert and oriented x 3. Ambulation without assistive device. FWB. EXTREMITIES: See musculoskeletal.  MUSCULOSKELETAL:   Head and Neck: Neck pain. Negative for misalignment, asymmetry, crepitation, defects, tenderness masses or effusions.  Left Upper Extremity: Inspection, percussion and palpation performed. Valverdes sign is negative.  Right Upper Extremity: Shoulder blade pain. Shooting pain and numbness in arm to hand. Inspection, percussion and palpation performed. Valverdes sign is negative.  Spine, Ribs and Pelvis: Inspection, percussion and palpation performed. Negative for misalignment, asymmetry, crepitation, defects, tenderness masses or effusions.  Left Lower Extremity: Inspection, percussion and palpation performed. Negative straight leg raise.  Right Lower Extremity: Inspection, percussion and palpation performed. Negative straight leg raise. SPINE EXAM:     Cervical spine: Neck is midline. Normal muscle tone. No focal atrophy is noted. ASSESSMENT    ICD-10-CM ICD-9-CM    1. S/P cervical spinal fusion Z98.1 V45.4 AMB POC XRAY, SPINE, CERVICAL; 2 OR 3   2. HNP (herniated nucleus pulposus), cervical M50.20 722.0    3. Paresthesia of right upper extremity R20.2 782.0 AMB POC XRAY, SPINE, CERVICAL; 2 OR 3   4. Neck pain M54.2 723.1 AMB POC XRAY, SPINE, CERVICAL; 2 OR 3   5. Dysphagia, unspecified type R13.10 787.20        Written by Mary Wilson, as dictated by Nathan Higuera MD.    I, Dr. Nathan Higuera MD, confirm that all documentation is accurate.

## 2019-11-15 NOTE — PATIENT INSTRUCTIONS
MRI of the Cervical Spine: About This Test  What is it? MRI (magnetic resonance imaging) is a test that uses a magnetic field and pulses of radio wave energy to make pictures of the organs and structures inside the body. An MRI can give your doctor information about the spine in your neck (the cervical spine). This can include the spine, the space around the spinal cord, and vertebrae in your neck. When you have an MRI, you lie on a table and the table moves into the MRI machine. Why is this test done? An MRI of the cervical spine can help find problems such as infection and tumors. It also can help diagnose narrowing of the spinal canal (spinal stenosis) and a herniated disc in the cervical spine. How can you prepare for the test?  Talk to your doctor about all your health conditions before the test. For example, tell your doctor if:  · You are allergic to any medicines. · You are or might be pregnant. · You have a pacemaker, an artificial limb, any metal pins or metal parts in your body, metal heart valves, metal clips in your brain, metal implants in your ears, or any other implanted or prosthetic medical device. · You have an intrauterine device (IUD) in place. · You get nervous in confined spaces. You may need medicine to help you relax. · You wear a patch that contains medicine. · You have kidney disease. What happens before the test?  · You will remove all metal objects, such as hearing aids, dentures, jewelry, watches, and hairpins. · You will take off all or most of your clothes and change into a gown. If you do leave some clothes on, make sure you take everything out of your pockets. · You may have contrast material (dye) put into your arm through a tube called an IV. Contrast material helps doctors see specific organs, blood vessels, and most tumors. What happens during the test?  · You will lie on your back on a table that is part of the MRI scanner.  Your head, chest, and arms may be held with straps to help you remain still. · The table will slide into the space that contains the magnet. A device called a coil may be placed over or wrapped around your neck. · Inside the scanner you will hear a fan and feel air moving. You may hear tapping, thumping, or snapping noises. You may be given earplugs or headphones to reduce the noise. · You will be asked to hold still during the scan. You may be asked to hold your breath for short periods. · You may be alone in the scanning room, but a technologist will be watching you through a window and talking with you during the test.  What else should you know about the test?  · An MRI does not hurt. · You may feel warmth in the area being examined. This is normal.  · A dye (contrast material) that contains gadolinium may be used in this test. Be sure to tell your doctor if:  ? You are pregnant or think you may be pregnant. ? You have kidney problems. ? You've had more than one test that used gadolinium. · The U.S. Food and Drug Administration (FDA) has safety warnings about gadolinium. But for most people, the benefit of its use in this test outweighs the risk. · If a dye is used, you may feel a quick sting or pinch and some coolness when the IV is started. The dye may give you a metallic taste in your mouth. Some people feel sick to their stomach or get a headache. · If you breastfeed and are concerned about whether the dye used in this test is safe, talk to your doctor. Most experts believe that very little dye passes into breast milk and even less is passed on to the baby. But if you prefer, you can store some of your breast milk ahead of time and use it for a day or two after the test.  How long does the test take? · The test usually takes 30 to 60 minutes but can take as long as 2 hours.   What happens after the test?  · You will probably be able to go home right away, depending on the reason for the test.  · You can go back to your usual activities right away. Follow-up care is a key part of your treatment and safety. Be sure to make and go to all appointments, and call your doctor if you are having problems. It's also a good idea to keep a list of the medicines you take. Ask your doctor when you can expect to have your test results. Where can you learn more? Go to http://rodrigo-denice.info/. Enter F223 in the search box to learn more about \"MRI of the Cervical Spine: About This Test.\"  Current as of: March 28, 2019  Content Version: 12.2  © 5523-3203 [employer]. Care instructions adapted under license by [employer] (which disclaims liability or warranty for this information). If you have questions about a medical condition or this instruction, always ask your healthcare professional. [address] any warranty or liability for your use of this information.

## 2019-11-20 ENCOUNTER — TELEPHONE (OUTPATIENT)
Dept: ORTHOPEDIC SURGERY | Age: 39
End: 2019-11-20

## 2019-11-20 NOTE — TELEPHONE ENCOUNTER
Patient called for Shaji Edmondson. Patient said she has been having Headaches that are turing into Migraine Headaches. Patient said she is wondering if their is something  could give her for Headaches. [facility] on [address]. [phone]. Patient tel. [phone]. Note : patient last seen by Shaji Edmondson on 11/15/19. Next appointment on 12/10/19.

## 2019-11-21 NOTE — TELEPHONE ENCOUNTER
Spoke with patient, informed of NP [place] message below. She stated she was told by her PCP that it would need to come from Dr. Ananth Kumar because he is handling her care. Informed patient that yes taking care of your spine. She stated that her headaches are related to her spine and she will discuss it with Dr. Ananth Kumar at her appointment. No further actions needed at this time.

## 2019-11-22 ENCOUNTER — HOSPITAL ENCOUNTER (OUTPATIENT)
Age: 39
Discharge: HOME | End: 2019-11-22
Attending: ORTHOPAEDIC SURGERY
Payer: OTHER GOVERNMENT

## 2019-11-22 DIAGNOSIS — R20.2 PARESTHESIA OF RIGHT UPPER EXTREMITY: ICD-10-CM

## 2019-11-22 DIAGNOSIS — Z98.1 S/P CERVICAL SPINAL FUSION: ICD-10-CM

## 2019-11-22 DIAGNOSIS — M54.2 NECK PAIN: ICD-10-CM

## 2019-11-22 PROCEDURE — 72141 MRI NECK SPINE W/O DYE: CPT

## 2019-12-10 ENCOUNTER — OFFICE VISIT (OUTPATIENT)
Dept: ORTHOPEDIC SURGERY | Age: 39
End: 2019-12-10

## 2019-12-10 VITALS
SYSTOLIC BLOOD PRESSURE: 108 MMHG | WEIGHT: 203 LBS | HEIGHT: 67 IN | DIASTOLIC BLOOD PRESSURE: 71 MMHG | BODY MASS INDEX: 31.86 KG/M2 | OXYGEN SATURATION: 100 % | TEMPERATURE: 98 F | RESPIRATION RATE: 16 BRPM | HEART RATE: 85 BPM

## 2019-12-10 DIAGNOSIS — R20.2 PARESTHESIA OF RIGHT UPPER EXTREMITY: ICD-10-CM

## 2019-12-10 DIAGNOSIS — M50.20 HNP (HERNIATED NUCLEUS PULPOSUS), CERVICAL: ICD-10-CM

## 2019-12-10 DIAGNOSIS — Z98.1 S/P CERVICAL SPINAL FUSION: Primary | ICD-10-CM

## 2019-12-10 RX ORDER — GABAPENTIN 300 MG/1
300 CAPSULE ORAL 3 TIMES DAILY
Qty: 90 CAP | Refills: 2 | Status: SHIPPED | OUTPATIENT
Start: 2019-12-10

## 2019-12-10 NOTE — PROGRESS NOTES
[address]  [address], [address]  [address]  Phone: [phone]  Fax: [phone]  PROGRESS NOTE  Patient: Richelle De                MRN: [id number]       SSN: [id number]  YOB: 1980        AGE: 35 y.o. SEX: female  Body mass index is 31.79 kg/m². PCP: Derrek Rivero DO  12/10/19    Chief Complaint   Patient presents with    Neck Pain     mri results neck       HISTORY OF PRESENT ILLNESS, RADIOGRAPHS, and PLAN:     HISTORY OF PRESENT ILLNESS:   returns today. Her neck pain has improved. She is neurologically intact. Whatever was flared appears to have improved. Her studies demonstrate no significant stenosis or instability. ASSESSMENT/PLAN: At this point, we will continue progressing her in activities. I will see her back in three months or as needed. cc: Derrek Rivero DO         Past Medical History:   Diagnosis Date    Fibromyalgia     Migraines        No family history on file. Current Outpatient Medications   Medication Sig Dispense Refill    gabapentin (NEURONTIN) 300 mg capsule Take 1 Cap by mouth three (3) times daily. Max Daily Amount: 900 mg. 90 Cap 0    SUMAtriptan (IMITREX) 50 mg tablet Take 1 Tab by mouth daily as needed for Migraine. 14 Tab 0    divalproex ER (DEPAKOTE ER) 500 mg ER tablet Take 500 mg by mouth daily.  TYLENOL EXTRA STRENGTH 500 mg tablet Take 500 mg by mouth two (2) times a day. 2 tablets      cyclobenzaprine (FLEXERIL) 10 mg tablet Take 10 mg by mouth two (2) times a day.  VOLTAREN 1 % gel Apply 2 g to affected area daily.       lidocaine (LIDODERM) 5 %       methylPREDNISolone (MEDROL DOSEPACK) 4 mg tablet Per dose pack instructions 1 Dose Pack 0       Allergies   Allergen Reactions    Iodinated Contrast Media Hives     Verified with pt, no issues with topical     Penicillin Other (comments)    Penicillins Hives    Shellfish Derived Hives       Past Surgical History:   Procedure Laterality Date    HX ADENOIDECTOMY      HX APPENDECTOMY      HX CERVICAL FUSION  2019    HX  SECTION      x3    HX TONSILLECTOMY      IR CHOLECYSTOSTOMY PERCUTANEOUS         Past Medical History:   Diagnosis Date    Fibromyalgia     Migraines        Social History     Socioeconomic History    Marital status:      Spouse name: Not on file    Number of children: Not on file    Years of education: Not on file    Highest education level: Not on file   Occupational History    Not on file   Social Needs    Financial resource strain: Not on file    Food insecurity:     Worry: Not on file     Inability: Not on file    Transportation needs:     Medical: Not on file     Non-medical: Not on file   Tobacco Use    Smoking status: Never Smoker    Smokeless tobacco: Never Used   Substance and Sexual Activity    Alcohol use: Not Currently    Drug use: Not Currently    Sexual activity: Not on file   Lifestyle    Physical activity:     Days per week: Not on file     Minutes per session: Not on file    Stress: Not on file   Relationships    Social connections:     Talks on phone: Not on file     Gets together: Not on file     Attends Hoahaoism service: Not on file     Active member of club or organization: Not on file     Attends meetings of clubs or organizations: Not on file     Relationship status: Not on file    Intimate partner violence:     Fear of current or ex partner: Not on file     Emotionally abused: Not on file     Physically abused: Not on file     Forced sexual activity: Not on file   Other Topics Concern     Service Not Asked    Blood Transfusions Not Asked    Caffeine Concern Not Asked    Occupational Exposure Not Asked   Rosalva Blizzard Hazards Not Asked    Sleep Concern Not Asked    Stress Concern Not Asked    Weight Concern Not Asked    Special Diet Not Asked    Back Care Not Asked    Exercise Not Asked    Bike Helmet Not Asked   [address] Not Asked    Self-Exams Not Asked   Social History Narrative    Not on file         REVIEW OF SYSTEMS:   CONSTITUTIONAL SYMPTOMS:  Negative. EYES:  Negative. EARS, NOSE, THROAT AND MOUTH:  Negative. CARDIOVASCULAR:  Negative. RESPIRATORY:  Negative. GENITOURINARY: Per HPI. GASTROINTESTINAL:  Per HPI. INTEGUMENTARY (SKIN AND/OR BREAST):  Negative. MUSCULOSKELETAL: Per HPI.   ENDOCRINE/RHEUMATOLOGIC:  Negative. NEUROLOGICAL:  Per HPI. HEMATOLOGIC/LYMPHATIC:  Negative. ALLERGIC/IMMUNOLOGIC:  Negative. PSYCHIATRIC:  Negative. PHYSICAL EXAMINATION:   Visit Vitals  /71   Pulse 85   Temp 98 °F (36.7 °C) (Oral)   Resp 16   Ht 5' 7\" (1.702 m)   Wt 203 lb (92.1 kg)   SpO2 100%   BMI 31.79 kg/m²    PAIN SCALE: 2/10    CONSTITUTIONAL: The patient is in no apparent distress and is alert and oriented x 3. HEENT: Normocephalic. Hearing grossly intact. NECK: Supple and symmetric. no tenderness, or masses were felt. RESPIRATORY: No labored breathing. CARDIOVASCULAR: The carotid pulses were normal. Peripheral pulses were 2+. CHEST: Normal AP diameter and normal contour without any kyphoscoliosis. LYMPHATIC: No lymphadenopathy was appreciated in the neck, axillae or groin. SKIN:  Incision healing well, no drainage, no erythema, no hernia, no seroma, no swelling, no dehiscence, incision well approximated. Negative for scars, rashes, lesions, or ulcers on the right upper, right lower, left upper, left lower and trunk. NEUROLOGICAL: Alert and oriented x 3. Ambulation without assistive device. FWB. EXTREMITIES: See musculoskeletal.  MUSCULOSKELETAL:   Head and Neck: Neck pain. Negative for misalignment, asymmetry, crepitation, defects, tenderness masses or effusions.  Left Upper Extremity: Inspection, percussion and palpation performed. Valverdes sign is negative.  Right Upper Extremity: Inspection, percussion and palpation performed.    Valverdes sign is negative.  Spine, Ribs and Pelvis: Inspection, percussion and palpation performed. Negative for misalignment, asymmetry, crepitation, defects, tenderness masses or effusions.  Left Lower Extremity: Inspection, percussion and palpation performed. Negative straight leg raise.  Right Lower Extremity: Inspection, percussion and palpation performed. Negative straight leg raise. SPINE EXAM:     Cervical spine: Neck is midline. Normal muscle tone. No focal atrophy is noted. ASSESSMENT    ICD-10-CM ICD-9-CM    1. S/P cervical spinal fusion Z98.1 V45.4 gabapentin (NEURONTIN) 300 mg capsule   2. HNP (herniated nucleus pulposus), cervical M50.20 722.0 gabapentin (NEURONTIN) 300 mg capsule   3. Paresthesia of right upper extremity R20.2 782.0 gabapentin (NEURONTIN) 300 mg capsule       Written by Wayne Sue, as dictated by Susi Deng MD.    I, Dr. Susi Deng MD, confirm that all documentation is accurate.

## 2022-03-18 PROBLEM — M50.20 HNP (HERNIATED NUCLEUS PULPOSUS), CERVICAL: Status: ACTIVE | Noted: 2019-07-17

## 2024-01-15 NOTE — TELEPHONE ENCOUNTER
Kin Gramajo did call the patient and told her that I need to speak to  and that I might be able to get her on the 16 or 18th. yann has a full schedule and I need to see if he will do a 6th case on Thursday. \" Refill requests received for methylphenidate and Ativan. Last visit 1/9/24 (note unsigned) \"She picked up her methylphenidate but never took it.   Depression and anxiety:  Patient continues lorazepam p.r.n. but stopped sertraline on her own.  She does not feel any different and does not want to resume it\".  Next follow up appt with Dr. Alberts scheduled 1/12/25.    Per PDMP:  Methylphenidate HCl  10MG / Tablet  Rx# [id number] Stimulant Qty: 30  Days: 30  Refills: 0 Prescribed: 5/31/2022  Dispensed: 6/3/2022  Sold: 6/9/2022 EDUARDA ALBERTS    Lorazepam  0.5MG / Tablet  Rx# [id number] Benzodiazepine Qty: 90  Days: 30  Refills: 0 Prescribed: 8/8/2023  Dispensed: 8/8/2023  Sold: 8/11/2023 EDUARDA ALBERTS    Scripts pended for Dr. Alberts to review and approve.

## 2025-04-08 ENCOUNTER — OFFICE VISIT (OUTPATIENT)
Age: 45
End: 2025-04-08
Payer: OTHER GOVERNMENT

## 2025-04-08 VITALS
WEIGHT: 178 LBS | DIASTOLIC BLOOD PRESSURE: 72 MMHG | BODY MASS INDEX: 27.94 KG/M2 | HEART RATE: 61 BPM | OXYGEN SATURATION: 99 % | TEMPERATURE: 97.9 F | SYSTOLIC BLOOD PRESSURE: 106 MMHG | RESPIRATION RATE: 18 BRPM | HEIGHT: 67 IN

## 2025-04-08 DIAGNOSIS — F44.5 PSYCHOGENIC NONEPILEPTIC SEIZURE: Primary | ICD-10-CM

## 2025-04-08 PROCEDURE — 99204 OFFICE O/P NEW MOD 45 MIN: CPT | Performed by: STUDENT IN AN ORGANIZED HEALTH CARE EDUCATION/TRAINING PROGRAM

## 2025-04-08 RX ORDER — BUPROPION HYDROCHLORIDE 300 MG/1
TABLET ORAL
COMMUNITY
Start: 2025-02-14

## 2025-04-08 RX ORDER — IBUPROFEN 400 MG/1
TABLET, FILM COATED ORAL
COMMUNITY
Start: 2025-03-13

## 2025-04-08 RX ORDER — ESCITALOPRAM OXALATE 5 MG/1
TABLET ORAL
COMMUNITY

## 2025-04-08 ASSESSMENT — ENCOUNTER SYMPTOMS
NAUSEA: 0
SHORTNESS OF BREATH: 0
BACK PAIN: 0
VOMITING: 0
COUGH: 0

## (undated) DEVICE — AIRLIFE™ ADULT CUSHION NASAL CANNULA 14 FOOT (4.3) CRUSH-RESISTANT OXYGEN TUBING, AND U/CONNECT-IT ADAPTER: Brand: AIRLIFE™

## (undated) DEVICE — COLLAR CERV L H3.25X23IN M DENS FOAM COT STOCK CVR LO

## (undated) DEVICE — SOLUTION IV 1000ML 0.9% SOD CHL

## (undated) DEVICE — DRAIN SURG W7MMXL20CM SIL FULL PERF HUBLESS FLAT RADPQ STRP

## (undated) DEVICE — KIT CLN UP BON SECOURS MARYV

## (undated) DEVICE — REM POLYHESIVE ADULT PATIENT RETURN ELECTRODE: Brand: VALLEYLAB

## (undated) DEVICE — INTENDED FOR TISSUE SEPARATION, AND OTHER PROCEDURES THAT REQUIRE A SHARP SURGICAL BLADE TO PUNCTURE OR CUT.: Brand: BARD-PARKER SAFETY BLADES SIZE 10, STERILE

## (undated) DEVICE — SUTURE VCRL SZ 3-0 L27IN ABSRB UD L26MM SH 1/2 CIR J416H

## (undated) DEVICE — 3M™ BAIR PAWS FLEX™ WARMING GOWN, STANDARD, 20 PER CASE 81003: Brand: BAIR PAWS™

## (undated) DEVICE — APPLICATOR BNDG 1MM ADH PREMIERPRO EXOFIN

## (undated) DEVICE — 3.0MM PRECISION NEURO (MATCH HEAD)

## (undated) DEVICE — STERILE POLYISOPRENE POWDER-FREE SURGICAL GLOVES: Brand: PROTEXIS

## (undated) DEVICE — Device

## (undated) DEVICE — FLEX ADVANTAGE 3000CC: Brand: FLEX ADVANTAGE

## (undated) DEVICE — WATERPROOF, BACTERIA PROOF DRESSING WITH ABSORBENT SEE THROUGH PAD: Brand: OPSITE POST-OP VISIBLE 10X8CM CTN 20

## (undated) DEVICE — INSULATED BLADE ELECTRODE: Brand: EDGE

## (undated) DEVICE — GARMENT,MEDLINE,DVT,INT,CALF,MED, GEN2: Brand: MEDLINE

## (undated) DEVICE — GOWN,REINFORCED,POLY,AURORA,XLARGE,STRL: Brand: MEDLINE

## (undated) DEVICE — SYRINGE MED 3ML NDL 22GA L1 1/2IN REG BVL SFGLDE

## (undated) DEVICE — 3M™ TEGADERM™ TRANSPARENT FILM DRESSING FRAME STYLE, 1626W, 4 IN X 4-3/4 IN (10 CM X 12 CM), 50/CT 4CT/CASE: Brand: 3M™ TEGADERM™

## (undated) DEVICE — SPONGE DISSECT PNUT SM 3/8IN -- 5/PK

## (undated) DEVICE — PREP CHLORAPREP 10.5 ML ORG --

## (undated) DEVICE — 10FR FRAZIER SUCTION HANDLE: Brand: CARDINAL HEALTH

## (undated) DEVICE — SCREW EXT FIX L14MM FOR DISTRCTN

## (undated) DEVICE — SSC BONE WAX: Brand: SSC BONE WAX

## (undated) DEVICE — APPLIER CLP AUTO MED 9.75 IN TI SURGCLP SUPER INTLOK 20 DISP

## (undated) DEVICE — SPIROMETER INCENT 2500ML W ONE W VLV

## (undated) DEVICE — PACKING 8004000 NEURAY 200PK 13X13MM: Brand: NEURAY ®